# Patient Record
Sex: MALE | Race: ASIAN | Employment: OTHER | URBAN - METROPOLITAN AREA
[De-identification: names, ages, dates, MRNs, and addresses within clinical notes are randomized per-mention and may not be internally consistent; named-entity substitution may affect disease eponyms.]

---

## 2020-07-27 PROBLEM — R35.0 URINARY FREQUENCY: Status: ACTIVE | Noted: 2020-07-27

## 2020-07-27 PROBLEM — N40.1 ENLARGED PROSTATE WITH LOWER URINARY TRACT SYMPTOMS (LUTS): Status: ACTIVE | Noted: 2020-07-27

## 2020-07-27 PROBLEM — R35.1 NOCTURIA: Status: ACTIVE | Noted: 2020-07-27

## 2020-07-27 PROBLEM — R39.15 URGENCY OF URINATION: Status: ACTIVE | Noted: 2020-07-27

## 2020-07-27 PROBLEM — N52.9 ED (ERECTILE DYSFUNCTION) OF ORGANIC ORIGIN: Status: ACTIVE | Noted: 2020-07-27

## 2023-05-06 ENCOUNTER — HOSPITAL ENCOUNTER (INPATIENT)
Facility: HOSPITAL | Age: 68
LOS: 2 days | Discharge: HOME | End: 2023-05-08
Attending: STUDENT IN AN ORGANIZED HEALTH CARE EDUCATION/TRAINING PROGRAM | Admitting: HOSPITALIST
Payer: MEDICARE

## 2023-05-06 ENCOUNTER — ANESTHESIA EVENT (OUTPATIENT)
Dept: ENDOSCOPY | Facility: HOSPITAL | Age: 68
End: 2023-05-06
Payer: MEDICARE

## 2023-05-06 DIAGNOSIS — K92.2 GI BLEED: ICD-10-CM

## 2023-05-06 DIAGNOSIS — K92.1 MELENA: Primary | ICD-10-CM

## 2023-05-06 LAB
ALBUMIN SERPL-MCNC: 3.2 G/DL (ref 3.4–5)
ALBUMIN/GLOB SERPL: 0.9 {RATIO} (ref 1–2)
ALP LIVER SERPL-CCNC: 35 U/L
ALT SERPL-CCNC: 17 U/L
ANION GAP SERPL CALC-SCNC: 4 MMOL/L (ref 0–18)
ANTIBODY SCREEN: NEGATIVE
AST SERPL-CCNC: 8 U/L (ref 15–37)
BASOPHILS # BLD AUTO: 0.02 X10(3) UL (ref 0–0.2)
BASOPHILS NFR BLD AUTO: 0.3 %
BILIRUB SERPL-MCNC: 0.4 MG/DL (ref 0.1–2)
BUN BLD-MCNC: 43 MG/DL (ref 7–18)
CALCIUM BLD-MCNC: 8.4 MG/DL (ref 8.5–10.1)
CHLORIDE SERPL-SCNC: 109 MMOL/L (ref 98–112)
CO2 SERPL-SCNC: 26 MMOL/L (ref 21–32)
CREAT BLD-MCNC: 1.42 MG/DL
EOSINOPHIL # BLD AUTO: 0.05 X10(3) UL (ref 0–0.7)
EOSINOPHIL NFR BLD AUTO: 0.7 %
ERYTHROCYTE [DISTWIDTH] IN BLOOD BY AUTOMATED COUNT: 13.1 %
GFR SERPLBLD BASED ON 1.73 SQ M-ARVRAT: 54 ML/MIN/1.73M2 (ref 60–?)
GLOBULIN PLAS-MCNC: 3.5 G/DL (ref 2.8–4.4)
GLUCOSE BLD-MCNC: 127 MG/DL (ref 70–99)
GLUCOSE BLD-MCNC: 168 MG/DL (ref 70–99)
GLUCOSE BLD-MCNC: 99 MG/DL (ref 70–99)
HCT VFR BLD AUTO: 39.8 %
HGB BLD-MCNC: 13.4 G/DL
IMM GRANULOCYTES # BLD AUTO: 0.02 X10(3) UL (ref 0–1)
IMM GRANULOCYTES NFR BLD: 0.3 %
LYMPHOCYTES # BLD AUTO: 2.23 X10(3) UL (ref 1–4)
LYMPHOCYTES NFR BLD AUTO: 30.6 %
MCH RBC QN AUTO: 32 PG (ref 26–34)
MCHC RBC AUTO-ENTMCNC: 33.7 G/DL (ref 31–37)
MCV RBC AUTO: 95 FL
MONOCYTES # BLD AUTO: 0.63 X10(3) UL (ref 0.1–1)
MONOCYTES NFR BLD AUTO: 8.6 %
NEUTROPHILS # BLD AUTO: 4.34 X10 (3) UL (ref 1.5–7.7)
NEUTROPHILS # BLD AUTO: 4.34 X10(3) UL (ref 1.5–7.7)
NEUTROPHILS NFR BLD AUTO: 59.5 %
OSMOLALITY SERPL CALC.SUM OF ELEC: 303 MOSM/KG (ref 275–295)
PLATELET # BLD AUTO: 192 10(3)UL (ref 150–450)
POTASSIUM SERPL-SCNC: 3.6 MMOL/L (ref 3.5–5.1)
PROT SERPL-MCNC: 6.7 G/DL (ref 6.4–8.2)
RBC # BLD AUTO: 4.19 X10(6)UL
RH BLOOD TYPE: POSITIVE
SODIUM SERPL-SCNC: 139 MMOL/L (ref 136–145)
WBC # BLD AUTO: 7.3 X10(3) UL (ref 4–11)

## 2023-05-06 PROCEDURE — 99223 1ST HOSP IP/OBS HIGH 75: CPT | Performed by: HOSPITALIST

## 2023-05-06 RX ORDER — LOSARTAN POTASSIUM 50 MG/1
50 TABLET ORAL DAILY
Status: DISCONTINUED | OUTPATIENT
Start: 2023-05-06 | End: 2023-05-08

## 2023-05-06 RX ORDER — SENNOSIDES 8.6 MG
17.2 TABLET ORAL NIGHTLY PRN
Status: DISCONTINUED | OUTPATIENT
Start: 2023-05-06 | End: 2023-05-08

## 2023-05-06 RX ORDER — MELATONIN
3 NIGHTLY PRN
Status: DISCONTINUED | OUTPATIENT
Start: 2023-05-06 | End: 2023-05-08

## 2023-05-06 RX ORDER — ACETAMINOPHEN 500 MG
1000 TABLET ORAL EVERY 4 HOURS PRN
Status: DISCONTINUED | OUTPATIENT
Start: 2023-05-06 | End: 2023-05-08

## 2023-05-06 RX ORDER — POLYETHYLENE GLYCOL 3350 17 G/17G
17 POWDER, FOR SOLUTION ORAL DAILY PRN
Status: DISCONTINUED | OUTPATIENT
Start: 2023-05-06 | End: 2023-05-08

## 2023-05-06 RX ORDER — FINASTERIDE 5 MG/1
5 TABLET, FILM COATED ORAL DAILY
Status: DISCONTINUED | OUTPATIENT
Start: 2023-05-06 | End: 2023-05-08

## 2023-05-06 RX ORDER — BENZONATATE 100 MG/1
200 CAPSULE ORAL 3 TIMES DAILY PRN
Status: DISCONTINUED | OUTPATIENT
Start: 2023-05-06 | End: 2023-05-08

## 2023-05-06 RX ORDER — BISACODYL 10 MG
10 SUPPOSITORY, RECTAL RECTAL
Status: DISCONTINUED | OUTPATIENT
Start: 2023-05-06 | End: 2023-05-08

## 2023-05-06 RX ORDER — ALLOPURINOL 100 MG/1
100 TABLET ORAL DAILY
Status: DISCONTINUED | OUTPATIENT
Start: 2023-05-06 | End: 2023-05-08

## 2023-05-06 RX ORDER — ONDANSETRON 2 MG/ML
4 INJECTION INTRAMUSCULAR; INTRAVENOUS EVERY 6 HOURS PRN
Status: DISCONTINUED | OUTPATIENT
Start: 2023-05-06 | End: 2023-05-08

## 2023-05-06 RX ORDER — SODIUM PHOSPHATE, DIBASIC AND SODIUM PHOSPHATE, MONOBASIC 7; 19 G/133ML; G/133ML
1 ENEMA RECTAL ONCE AS NEEDED
Status: DISCONTINUED | OUTPATIENT
Start: 2023-05-06 | End: 2023-05-08

## 2023-05-06 RX ORDER — METOCLOPRAMIDE HYDROCHLORIDE 5 MG/ML
10 INJECTION INTRAMUSCULAR; INTRAVENOUS EVERY 8 HOURS PRN
Status: DISCONTINUED | OUTPATIENT
Start: 2023-05-06 | End: 2023-05-08

## 2023-05-06 RX ORDER — NICOTINE POLACRILEX 4 MG
30 LOZENGE BUCCAL
Status: DISCONTINUED | OUTPATIENT
Start: 2023-05-06 | End: 2023-05-08

## 2023-05-06 RX ORDER — ATENOLOL 25 MG/1
25 TABLET ORAL
Status: DISCONTINUED | OUTPATIENT
Start: 2023-05-07 | End: 2023-05-08

## 2023-05-06 RX ORDER — NICOTINE POLACRILEX 4 MG
15 LOZENGE BUCCAL
Status: DISCONTINUED | OUTPATIENT
Start: 2023-05-06 | End: 2023-05-08

## 2023-05-06 RX ORDER — TAMSULOSIN HYDROCHLORIDE 0.4 MG/1
0.4 CAPSULE ORAL
Status: DISCONTINUED | OUTPATIENT
Start: 2023-05-07 | End: 2023-05-08

## 2023-05-06 RX ORDER — DEXTROSE MONOHYDRATE 25 G/50ML
50 INJECTION, SOLUTION INTRAVENOUS
Status: DISCONTINUED | OUTPATIENT
Start: 2023-05-06 | End: 2023-05-08

## 2023-05-06 NOTE — ED INITIAL ASSESSMENT (HPI)
Patient arrives to the ED via [facility] from an Immediate Care with c/o fatigue,weakness, and black stool that started between yesterday and a couple days ago. Patient also c/o dizziness and intermittent shortness of breath.

## 2023-05-06 NOTE — PLAN OF CARE
Patient admitted via [address] to room. Safety precautions initiated. Bed in low position. Call light in reach. Pt AOx4. VSS. No complain of pain N/V/D. Up/ independent. Good appetite. Clear liquid diet maintained. NPO after midnight/ will endorse to the next shift. EGD planned tomorrow 8 am.  Stool collected for H. Pylori/ pending. Will continue plan of care.        Problem: GASTROINTESTINAL - ADULT  Goal: Minimal or absence of nausea and vomiting  Description: INTERVENTIONS:  - Maintain adequate hydration with IV or PO as ordered and tolerated  - Nasogastric tube to low intermittent suction as ordered  - Evaluate effectiveness of ordered antiemetic medications  - Provide nonpharmacologic comfort measures as appropriate  - Advance diet as tolerated, if ordered  - Obtain nutritional consult as needed  - Evaluate fluid balance  Outcome: Progressing  Goal: Maintains or returns to baseline bowel function  Description: INTERVENTIONS:  - Assess bowel function  - Maintain adequate hydration with IV or PO as ordered and tolerated  - Evaluate effectiveness of GI medications  - Encourage mobilization and activity  - Obtain nutritional consult as needed  - Establish a toileting routine/schedule  - Consider collaborating with pharmacy to review patient's medication profile  Outcome: Progressing

## 2023-05-07 ENCOUNTER — ANESTHESIA (OUTPATIENT)
Dept: ENDOSCOPY | Facility: HOSPITAL | Age: 68
End: 2023-05-07
Payer: MEDICARE

## 2023-05-07 LAB
ALBUMIN SERPL-MCNC: 2.9 G/DL (ref 3.4–5)
ALBUMIN/GLOB SERPL: 0.9 {RATIO} (ref 1–2)
ALP LIVER SERPL-CCNC: 32 U/L
ALT SERPL-CCNC: 18 U/L
ANION GAP SERPL CALC-SCNC: 5 MMOL/L (ref 0–18)
AST SERPL-CCNC: 10 U/L (ref 15–37)
ATRIAL RATE: 110 BPM
BILIRUB SERPL-MCNC: 0.7 MG/DL (ref 0.1–2)
BUN BLD-MCNC: 23 MG/DL (ref 7–18)
CALCIUM BLD-MCNC: 8 MG/DL (ref 8.5–10.1)
CHLORIDE SERPL-SCNC: 112 MMOL/L (ref 98–112)
CO2 SERPL-SCNC: 24 MMOL/L (ref 21–32)
CREAT BLD-MCNC: 1.27 MG/DL
ERYTHROCYTE [DISTWIDTH] IN BLOOD BY AUTOMATED COUNT: 13.2 %
GFR SERPLBLD BASED ON 1.73 SQ M-ARVRAT: 62 ML/MIN/1.73M2 (ref 60–?)
GLOBULIN PLAS-MCNC: 3.1 G/DL (ref 2.8–4.4)
GLUCOSE BLD-MCNC: 109 MG/DL (ref 70–99)
GLUCOSE BLD-MCNC: 118 MG/DL (ref 70–99)
GLUCOSE BLD-MCNC: 120 MG/DL (ref 70–99)
GLUCOSE BLD-MCNC: 190 MG/DL (ref 70–99)
GLUCOSE BLD-MCNC: 192 MG/DL (ref 70–99)
GLUCOSE BLD-MCNC: 93 MG/DL (ref 70–99)
HCT VFR BLD AUTO: 35.6 %
HGB BLD-MCNC: 11.6 G/DL
HGB BLD-MCNC: 11.6 G/DL
HGB BLD-MCNC: 12.1 G/DL
HGB BLD-MCNC: 12.8 G/DL
MCH RBC QN AUTO: 32 PG (ref 26–34)
MCHC RBC AUTO-ENTMCNC: 32.6 G/DL (ref 31–37)
MCV RBC AUTO: 98.3 FL
OSMOLALITY SERPL CALC.SUM OF ELEC: 297 MOSM/KG (ref 275–295)
P AXIS: 58 DEGREES
P-R INTERVAL: 142 MS
PLATELET # BLD AUTO: 175 10(3)UL (ref 150–450)
POTASSIUM SERPL-SCNC: 3.7 MMOL/L (ref 3.5–5.1)
PROT SERPL-MCNC: 6 G/DL (ref 6.4–8.2)
Q-T INTERVAL: 326 MS
QRS DURATION: 84 MS
QTC CALCULATION (BEZET): 441 MS
R AXIS: 48 DEGREES
RBC # BLD AUTO: 3.62 X10(6)UL
SODIUM SERPL-SCNC: 141 MMOL/L (ref 136–145)
T AXIS: 52 DEGREES
VENTRICULAR RATE: 110 BPM
WBC # BLD AUTO: 5.7 X10(3) UL (ref 4–11)

## 2023-05-07 PROCEDURE — 0DB98ZX EXCISION OF DUODENUM, VIA NATURAL OR ARTIFICIAL OPENING ENDOSCOPIC, DIAGNOSTIC: ICD-10-PCS | Performed by: INTERNAL MEDICINE

## 2023-05-07 PROCEDURE — 99232 SBSQ HOSP IP/OBS MODERATE 35: CPT | Performed by: HOSPITALIST

## 2023-05-07 PROCEDURE — 0DB68ZX EXCISION OF STOMACH, VIA NATURAL OR ARTIFICIAL OPENING ENDOSCOPIC, DIAGNOSTIC: ICD-10-PCS | Performed by: INTERNAL MEDICINE

## 2023-05-07 RX ORDER — SODIUM CHLORIDE, SODIUM LACTATE, POTASSIUM CHLORIDE, CALCIUM CHLORIDE 600; 310; 30; 20 MG/100ML; MG/100ML; MG/100ML; MG/100ML
INJECTION, SOLUTION INTRAVENOUS CONTINUOUS
Status: DISCONTINUED | OUTPATIENT
Start: 2023-05-07 | End: 2023-05-08

## 2023-05-07 RX ORDER — NALOXONE HYDROCHLORIDE 0.4 MG/ML
80 INJECTION, SOLUTION INTRAMUSCULAR; INTRAVENOUS; SUBCUTANEOUS AS NEEDED
Status: DISCONTINUED | OUTPATIENT
Start: 2023-05-07 | End: 2023-05-07 | Stop reason: HOSPADM

## 2023-05-07 RX ORDER — LIDOCAINE HYDROCHLORIDE 10 MG/ML
INJECTION, SOLUTION EPIDURAL; INFILTRATION; INTRACAUDAL; PERINEURAL AS NEEDED
Status: DISCONTINUED | OUTPATIENT
Start: 2023-05-07 | End: 2023-05-07 | Stop reason: SURG

## 2023-05-07 RX ORDER — EPHEDRINE SULFATE 50 MG/ML
INJECTION INTRAVENOUS AS NEEDED
Status: DISCONTINUED | OUTPATIENT
Start: 2023-05-07 | End: 2023-05-07 | Stop reason: SURG

## 2023-05-07 RX ORDER — SODIUM CHLORIDE, SODIUM LACTATE, POTASSIUM CHLORIDE, CALCIUM CHLORIDE 600; 310; 30; 20 MG/100ML; MG/100ML; MG/100ML; MG/100ML
INJECTION, SOLUTION INTRAVENOUS CONTINUOUS PRN
Status: DISCONTINUED | OUTPATIENT
Start: 2023-05-07 | End: 2023-05-07 | Stop reason: SURG

## 2023-05-07 RX ADMIN — EPHEDRINE SULFATE 10 MG: 50 INJECTION INTRAVENOUS at 08:11:00

## 2023-05-07 RX ADMIN — LIDOCAINE HYDROCHLORIDE 5 ML: 10 INJECTION, SOLUTION EPIDURAL; INFILTRATION; INTRACAUDAL; PERINEURAL at 08:09:00

## 2023-05-07 RX ADMIN — EPHEDRINE SULFATE 10 MG: 50 INJECTION INTRAVENOUS at 08:13:00

## 2023-05-07 RX ADMIN — SODIUM CHLORIDE, SODIUM LACTATE, POTASSIUM CHLORIDE, CALCIUM CHLORIDE: 600; 310; 30; 20 INJECTION, SOLUTION INTRAVENOUS at 08:05:00

## 2023-05-07 RX ADMIN — EPHEDRINE SULFATE 10 MG: 50 INJECTION INTRAVENOUS at 08:16:00

## 2023-05-07 RX ADMIN — EPHEDRINE SULFATE 10 MG: 50 INJECTION INTRAVENOUS at 08:20:00

## 2023-05-07 NOTE — PROGRESS NOTES
BRIEF DULY GI PROGRESS NOTE    Completed EGD today, see Op Report for findings and recommendations. - Advise observation on PPI gtt for 1 more day, likely dc home tomorrow if o/w stable.     Delayne Dubin, MD  [facility]  Department of Gastroenterology

## 2023-05-07 NOTE — ANESTHESIA POSTPROCEDURE EVALUATION
[facility]. Patient Status:  Inpatient   Age/Gender 76year old male MRN [id number]   Location [address] [address] Attending Brenda Finley MD   [facility] Day # 1 PCP Fariha Marquez DO       Anesthesia Post-op Note    ESOPHAGOGASTRODUODENOSCOPY (EGD) with biopises     Procedure Summary     Date: 05/07/23 Room / Location: [facility] ENDOSCOPY 03 / [facility] ENDOSCOPY    Anesthesia Start: 0805 Anesthesia Stop: 0590    Procedure: ESOPHAGOGASTRODUODENOSCOPY (EGD) with biopises Diagnosis:       GI bleed      (Gastric ulcer no active bleeding, duodenitis, small duodenal ulcer)    Surgeons: Yamile Mejia MD Anesthesiologist: Ken Andrade MD    Anesthesia Type: MAC ASA Status: 2          Anesthesia Type: MAC    Vitals Value Taken Time   /67 05/07/23 0834   Temp  05/07/23 0837   Pulse 101 05/07/23 0836   Resp 23 05/07/23 0836   SpO2 96 % 05/07/23 0836   Vitals shown include unvalidated device data. Patient Location: Endoscopy    Anesthesia Type: MAC    Airway Patency: patent    Postop Pain Control: adequate    Mental Status: preanesthetic baseline    Nausea/Vomiting: none    Cardiopulmonary/Hydration status: stable euvolemic    Complications: no apparent anesthesia related complications    Postop vital signs: stable    Dental Exam: Unchanged from Preop    Patient to be discharged home when criteria met.

## 2023-05-07 NOTE — PLAN OF CARE
Pt AOx4. VSS. No complain of pain N/V/D. Up/ independent. Good appetite. EGD done this morning. LR 1000ml finished infusing per order. Pt advanced from full liquid to Regular diet. Tolerated well. Protonix 10 ml/hr infusing. Will continue plan of care.      Problem: GASTROINTESTINAL - ADULT  Goal: Minimal or absence of nausea and vomiting  Description: INTERVENTIONS:  - Maintain adequate hydration with IV or PO as ordered and tolerated  - Nasogastric tube to low intermittent suction as ordered  - Evaluate effectiveness of ordered antiemetic medications  - Provide nonpharmacologic comfort measures as appropriate  - Advance diet as tolerated, if ordered  - Obtain nutritional consult as needed  - Evaluate fluid balance  Outcome: Progressing  Goal: Maintains or returns to baseline bowel function  Description: INTERVENTIONS:  - Assess bowel function  - Maintain adequate hydration with IV or PO as ordered and tolerated  - Evaluate effectiveness of GI medications  - Encourage mobilization and activity  - Obtain nutritional consult as needed  - Establish a toileting routine/schedule  - Consider collaborating with pharmacy to review patient's medication profile  Outcome: Progressing

## 2023-05-07 NOTE — PROGRESS NOTES
Alert and orientated x4. No complaints of pain. No complaints of nausea or diarrhea. Ambulating to bathroom. protonix drip infusing.   NPO for EGD in am.

## 2023-05-08 VITALS
RESPIRATION RATE: 16 BRPM | WEIGHT: 154.63 LBS | SYSTOLIC BLOOD PRESSURE: 115 MMHG | DIASTOLIC BLOOD PRESSURE: 74 MMHG | HEIGHT: 66 IN | HEART RATE: 73 BPM | OXYGEN SATURATION: 97 % | TEMPERATURE: 98 F | BODY MASS INDEX: 24.85 KG/M2

## 2023-05-08 LAB
GLUCOSE BLD-MCNC: 103 MG/DL (ref 70–99)
GLUCOSE BLD-MCNC: 104 MG/DL (ref 70–99)
H PYLORI AG STL QL IA: POSITIVE
HGB BLD-MCNC: 11.4 G/DL
HGB BLD-MCNC: 12.1 G/DL

## 2023-05-08 PROCEDURE — 99239 HOSP IP/OBS DSCHRG MGMT >30: CPT | Performed by: HOSPITALIST

## 2023-05-08 RX ORDER — PANTOPRAZOLE SODIUM 40 MG/1
40 TABLET, DELAYED RELEASE ORAL
Qty: 120 TABLET | Refills: 0 | Status: SHIPPED | OUTPATIENT
Start: 2023-05-08

## 2023-05-08 RX ORDER — PANTOPRAZOLE SODIUM 40 MG/1
40 TABLET, DELAYED RELEASE ORAL
Status: DISCONTINUED | OUTPATIENT
Start: 2023-05-08 | End: 2023-05-08

## 2023-05-08 NOTE — PROGRESS NOTES
Patient alert x4, VSS. hgb checks q8 hours. Hgb overnight was 11.4. MD paged about result since Hgb dropped from 12. 1. no active bleeding noted. protonix gtt throughout night. No new orders per MD. All meds given per [facility]. Ambulates self to bathroom. Slept well overnight.

## 2024-03-09 ENCOUNTER — HOSPITAL ENCOUNTER (EMERGENCY)
Facility: HOSPITAL | Age: 69
Discharge: HOME | End: 2024-03-09
Attending: EMERGENCY MEDICINE
Payer: MEDICARE

## 2024-03-09 ENCOUNTER — APPOINTMENT (OUTPATIENT)
Dept: CT IMAGING | Facility: HOSPITAL | Age: 69
End: 2024-03-09
Attending: EMERGENCY MEDICINE
Payer: MEDICARE

## 2024-03-09 ENCOUNTER — APPOINTMENT (OUTPATIENT)
Dept: MRI IMAGING | Facility: HOSPITAL | Age: 69
End: 2024-03-09
Attending: EMERGENCY MEDICINE
Payer: MEDICARE

## 2024-03-09 VITALS
OXYGEN SATURATION: 98 % | WEIGHT: 146.63 LBS | HEIGHT: 63 IN | BODY MASS INDEX: 25.98 KG/M2 | HEART RATE: 67 BPM | DIASTOLIC BLOOD PRESSURE: 85 MMHG | TEMPERATURE: 98 F | RESPIRATION RATE: 16 BRPM | SYSTOLIC BLOOD PRESSURE: 128 MMHG

## 2024-03-09 DIAGNOSIS — G45.4 TGA (TRANSIENT GLOBAL AMNESIA): Primary | ICD-10-CM

## 2024-03-09 LAB
ALBUMIN SERPL-MCNC: 3.9 G/DL (ref 3.4–5)
ALBUMIN/GLOB SERPL: 0.9 {RATIO} (ref 1–2)
ALP LIVER SERPL-CCNC: 44 U/L
ALT SERPL-CCNC: 18 U/L
ANION GAP SERPL CALC-SCNC: 7 MMOL/L (ref 0–18)
AST SERPL-CCNC: 20 U/L (ref 15–37)
BASOPHILS # BLD AUTO: 0.03 X10(3) UL (ref 0–0.2)
BASOPHILS NFR BLD AUTO: 0.5 %
BILIRUB SERPL-MCNC: 0.6 MG/DL (ref 0.1–2)
BILIRUB UR QL STRIP.AUTO: NEGATIVE
BUN BLD-MCNC: 20 MG/DL (ref 9–23)
CALCIUM BLD-MCNC: 9.6 MG/DL (ref 8.5–10.1)
CHLORIDE SERPL-SCNC: 106 MMOL/L (ref 98–112)
CLARITY UR REFRACT.AUTO: CLEAR
CO2 SERPL-SCNC: 25 MMOL/L (ref 21–32)
COLOR UR AUTO: COLORLESS
CREAT BLD-MCNC: 1.29 MG/DL
EGFRCR SERPLBLD CKD-EPI 2021: 60 ML/MIN/1.73M2 (ref 60–?)
EOSINOPHIL # BLD AUTO: 0.02 X10(3) UL (ref 0–0.7)
EOSINOPHIL NFR BLD AUTO: 0.3 %
ERYTHROCYTE [DISTWIDTH] IN BLOOD BY AUTOMATED COUNT: 12.6 %
GLOBULIN PLAS-MCNC: 4.2 G/DL (ref 2.8–4.4)
GLUCOSE BLD-MCNC: 128 MG/DL (ref 70–99)
GLUCOSE BLD-MCNC: 138 MG/DL (ref 70–99)
GLUCOSE UR STRIP.AUTO-MCNC: NORMAL MG/DL
HCT VFR BLD AUTO: 48 %
HGB BLD-MCNC: 16 G/DL
IMM GRANULOCYTES # BLD AUTO: 0.02 X10(3) UL (ref 0–1)
IMM GRANULOCYTES NFR BLD: 0.3 %
INR BLD: 0.96 (ref 0.8–1.2)
KETONES UR STRIP.AUTO-MCNC: NEGATIVE MG/DL
LEUKOCYTE ESTERASE UR QL STRIP.AUTO: NEGATIVE
LYMPHOCYTES # BLD AUTO: 1.19 X10(3) UL (ref 1–4)
LYMPHOCYTES NFR BLD AUTO: 18.9 %
MCH RBC QN AUTO: 33.1 PG (ref 26–34)
MCHC RBC AUTO-ENTMCNC: 33.3 G/DL (ref 31–37)
MCV RBC AUTO: 99.4 FL
MONOCYTES # BLD AUTO: 0.36 X10(3) UL (ref 0.1–1)
MONOCYTES NFR BLD AUTO: 5.7 %
NEUTROPHILS # BLD AUTO: 4.67 X10 (3) UL (ref 1.5–7.7)
NEUTROPHILS # BLD AUTO: 4.67 X10(3) UL (ref 1.5–7.7)
NEUTROPHILS NFR BLD AUTO: 74.3 %
NITRITE UR QL STRIP.AUTO: NEGATIVE
OSMOLALITY SERPL CALC.SUM OF ELEC: 291 MOSM/KG (ref 275–295)
PH UR STRIP.AUTO: 6 [PH] (ref 5–8)
PLATELET # BLD AUTO: 199 10(3)UL (ref 150–450)
POTASSIUM SERPL-SCNC: 4.2 MMOL/L (ref 3.5–5.1)
PROT SERPL-MCNC: 8.1 G/DL (ref 6.4–8.2)
PROT UR STRIP.AUTO-MCNC: NEGATIVE MG/DL
PROTHROMBIN TIME: 12.8 SECONDS (ref 11.6–14.8)
RBC # BLD AUTO: 4.83 X10(6)UL
RBC UR QL AUTO: NEGATIVE
SODIUM SERPL-SCNC: 138 MMOL/L (ref 136–145)
SP GR UR STRIP.AUTO: 1.01 (ref 1–1.03)
TROPONIN I SERPL HS-MCNC: 4 NG/L
UROBILINOGEN UR STRIP.AUTO-MCNC: NORMAL MG/DL
WBC # BLD AUTO: 6.3 X10(3) UL (ref 4–11)

## 2024-03-09 PROCEDURE — 93010 ELECTROCARDIOGRAM REPORT: CPT

## 2024-03-09 PROCEDURE — 82962 GLUCOSE BLOOD TEST: CPT

## 2024-03-09 PROCEDURE — 99284 EMERGENCY DEPT VISIT MOD MDM: CPT

## 2024-03-09 PROCEDURE — 70553 MRI BRAIN STEM W/O & W/DYE: CPT | Performed by: EMERGENCY MEDICINE

## 2024-03-09 PROCEDURE — 99285 EMERGENCY DEPT VISIT HI MDM: CPT

## 2024-03-09 PROCEDURE — 81003 URINALYSIS AUTO W/O SCOPE: CPT | Performed by: EMERGENCY MEDICINE

## 2024-03-09 PROCEDURE — 85025 COMPLETE CBC W/AUTO DIFF WBC: CPT | Performed by: EMERGENCY MEDICINE

## 2024-03-09 PROCEDURE — A9575 INJ GADOTERATE MEGLUMI 0.1ML: HCPCS | Performed by: EMERGENCY MEDICINE

## 2024-03-09 PROCEDURE — 80053 COMPREHEN METABOLIC PANEL: CPT | Performed by: EMERGENCY MEDICINE

## 2024-03-09 PROCEDURE — 70450 CT HEAD/BRAIN W/O DYE: CPT | Performed by: EMERGENCY MEDICINE

## 2024-03-09 PROCEDURE — 84484 ASSAY OF TROPONIN QUANT: CPT | Performed by: EMERGENCY MEDICINE

## 2024-03-09 PROCEDURE — 85610 PROTHROMBIN TIME: CPT | Performed by: EMERGENCY MEDICINE

## 2024-03-09 PROCEDURE — 93005 ELECTROCARDIOGRAM TRACING: CPT

## 2024-03-09 PROCEDURE — 36415 COLL VENOUS BLD VENIPUNCTURE: CPT

## 2024-03-09 RX ORDER — ASPIRIN 325 MG
325 TABLET, DELAYED RELEASE (ENTERIC COATED) ORAL EVERY 6 HOURS PRN
Qty: 120 TABLET | Refills: 0 | Status: SHIPPED | OUTPATIENT
Start: 2024-03-09 | End: 2024-07-07

## 2024-03-09 RX ORDER — GADOTERATE MEGLUMINE 376.9 MG/ML
20 INJECTION INTRAVENOUS
Status: COMPLETED | OUTPATIENT
Start: 2024-03-09 | End: 2024-03-09

## 2024-03-10 LAB
ATRIAL RATE: 76 BPM
P AXIS: 56 DEGREES
P-R INTERVAL: 162 MS
Q-T INTERVAL: 366 MS
QRS DURATION: 86 MS
QTC CALCULATION (BEZET): 411 MS
R AXIS: 42 DEGREES
T AXIS: 51 DEGREES
VENTRICULAR RATE: 76 BPM

## 2024-03-10 NOTE — ED QUICK NOTES
Report received from IVIS Bean. Rounding Completed    Plan of Care reviewed. Waiting for urinalysis results.  Elimination needs assessed.  Provided update on plan of care.    Bed is locked and in lowest position. Call light within reach.

## 2024-03-10 NOTE — DISCHARGE INSTRUCTIONS
Transient global amnesia    Overview  Transient global amnesia is an episode of confusion that comes on suddenly in a person who is otherwise alert. This confused state isn't caused by a more common neurological condition, such as epilepsy or stroke.    During an episode of transient global amnesia, a person is unable to create new memory, so the memory of recent events disappears. You can't remember where you are or how you got there. You may not remember anything about what's happening right now. You may keep repeating the same questions because you don't remember the answers you've just been given. You may also draw a blank when asked to remember things that happened a day, a month or even a year ago.    The condition most often affects people in middle or older age. With transient global amnesia, you do remember who you are, and you recognize the people you know well. Episodes of transient global amnesia always get better slowly over a few hours. During recovery, you may begin to remember events and circumstances. Transient global amnesia isn't serious, but it can still be frightening.      Symptoms  The main symptom of transient global amnesia is being unable to create new memories and remember the recent past. Once that symptom is confirmed, ruling out other possible causes of amnesia is important.    You must have these signs and symptoms to be diagnosed with transient global amnesia:    Sudden onset of confusion that includes memory loss, seen by a witness  Being awake and alert and knowing who you are, despite memory loss  Normal cognition, such as the ability to recognize and name familiar objects and follow simple directions  No signs of damage to a particular area of the brain, such as being unable to move an arm or leg, movements you can't control, or problems understanding words  More symptoms and history that may help diagnose transient global amnesia:    Symptoms lasting no more than 24 hours and  generally shorter  Gradual return of memory  No recent head injury  No signs of seizures during the period of amnesia  No history of active epilepsy  Another common sign of transient global amnesia due to the inability to create new memories includes repetitive questioning, usually of the same question -- for example, \"What am I doing here?\" or \"How did we get here?\"        When to see a doctor  Seek immediate medical attention for anyone who quickly goes from normal awareness of present reality to confusion about what just happened. If the person experiencing memory loss is too confused to call an ambulance, call one yourself.    Transient global amnesia isn't dangerous. But there's no easy way to tell the difference between transient global amnesia and the life-threatening illnesses that can also cause sudden memory loss.          Causes  The underlying cause of transient global amnesia is unknown. There may be a link between transient global amnesia and a history of migraines. But experts don't understand the factors that contribute to both conditions. Another possible cause is the overfilling of veins with blood due to some sort of blockage or other problem with the flow of blood (venous congestion).    While the likelihood of transient global amnesia after these events is very low, some commonly reported events that may trigger it include:    Sudden immersion in cold or hot water  Strenuous physical activity  Sexual intercourse  Medical procedures, such as angiography or endoscopy  Mild head trauma  Being emotionally upset, perhaps by bad news, conflict or overwork  Risk factors  Interestingly, many studies have found that high blood pressure and high cholesterol -- which are closely linked to strokes -- are not risk factors for transient global amnesia. This is probably because transient global amnesia doesn't represent blood vessel diseases of aging. Your sex doesn't seem to affect your risk, either.    The  clearest risk factors are:    Age. People age 50 and older have a higher risk of transient global amnesia than do younger people.  History of migraines. If you have migraines, your risk of transient global amnesia is significantly higher than that of someone without migraines.  Complications  Transient global amnesia has no direct complications. It's not a risk factor for stroke or epilepsy. It's possible to have a second episode of transient global amnesia, but it's extremely rare to have more than two.    But even temporary memory loss can cause emotional distress. If you need reassurance, ask your doctor to go over the results of your neurological exam and diagnostic tests with you.    Prevention  Because the cause of transient global amnesia is unknown and the rate of recurrence is low, there's no real way to prevent the condition.

## 2024-03-10 NOTE — ED PROVIDER NOTES
Patient Seen in: [facility] Emergency Department      History     Chief Complaint   Patient presents with    Altered Mental Status     Stated Complaint:     Subjective:   HPI    68-year-old male with hypertension, chronic kidney disease brought to ED for evaluation for altered mental status.  Patient arrived by ambulance he was repeating some questions which was somewhat concerning.  Patient states he is just feeling anxious lately.  Denies any focal motor or sensory gait vision or speech problems.  No headache.  Denies any chest pain shortness of breath abdominal pain nausea vomit diarrhea.  No other associate symptoms.  No other complaints.    Objective:   Past Medical History:   Diagnosis Date    Essential hypertension     GOUT     HYPERTENSION               Past Surgical History:   Procedure Laterality Date    COLONOSCOPY  08/2016    two 3 mm rectosigmoid polyps     COLONOSCOPY,DIAGNOSTIC  6/26/08    normal    OTHER SURGICAL HISTORY  9-    Prostate Biopsy - Dr. SANTO    OTHER SURGICAL HISTORY  08/31/2020    cystoscopy/[facility] Dr. Echeverria                Social History     Socioeconomic History    Marital status:    Tobacco Use    Smoking status: Never    Smokeless tobacco: Never   Substance and Sexual Activity    Alcohol use: No              Review of Systems    Positive for stated complaint:   Other systems are as noted in HPI.  Constitutional and vital signs reviewed.      All other systems reviewed and negative except as noted above.    Physical Exam     ED Triage Vitals   BP 03/09/24 1745 144/66   Pulse 03/09/24 1750 78   Resp 03/09/24 1750 18   Temp 03/09/24 1750 98 °F (36.7 °C)   Temp src 03/09/24 1750 Temporal   SpO2 03/09/24 1750 99 %   O2 Device 03/09/24 1750 None (Room air)       Current:/85   Pulse 63   Temp 98 °F (36.7 °C) (Temporal)   Resp 16   Ht 160 cm (5' 3\")   Wt 66.5 kg   SpO2 97%   BMI 25.97 kg/m²         Physical Exam  Vitals and nursing note reviewed.    Constitutional:       General: He is not in acute distress.     Appearance: He is well-developed.   HENT:      Head: Normocephalic and atraumatic.      Mouth/Throat:      Pharynx: No oropharyngeal exudate.   Eyes:      General: No scleral icterus.     Conjunctiva/sclera: Conjunctivae normal.      Pupils: Pupils are equal, round, and reactive to light.   Cardiovascular:      Rate and Rhythm: Normal rate and regular rhythm.      Heart sounds: No murmur heard.     No friction rub. No gallop.   Pulmonary:      Effort: Pulmonary effort is normal. No respiratory distress.      Breath sounds: Normal breath sounds. No stridor. No wheezing or rales.   Abdominal:      General: There is no distension.      Palpations: Abdomen is soft.      Tenderness: There is no abdominal tenderness. There is no guarding or rebound.   Musculoskeletal:         General: No tenderness. Normal range of motion.      Cervical back: Normal range of motion and neck supple.   Skin:     General: Skin is warm and dry.      Findings: No erythema or rash.   Neurological:      General: No focal deficit present.      Mental Status: He is alert and oriented to person, place, and time.      Cranial Nerves: No cranial nerve deficit.      Sensory: No sensory deficit.      Motor: No weakness or abnormal muscle tone.      Coordination: Coordination normal.      Gait: Gait normal.      Comments: Patient was independently able to get out the EMS gurney and then placed himself into the cot   Psychiatric:         Mood and Affect: Mood normal.         Behavior: Behavior normal.              ED Course     Labs Reviewed   COMP METABOLIC PANEL (14) - Abnormal; Notable for the following components:       Result Value    Glucose 138 (*)     Alkaline Phosphatase 44 (*)     A/G Ratio 0.9 (*)     All other components within normal limits   URINALYSIS, ROUTINE - Abnormal; Notable for the following components:    Urine Color Colorless (*)     Bacteria Urine Rare (*)     All  other components within normal limits   POCT GLUCOSE - Abnormal; Notable for the following components:    POC Glucose 128 (*)     All other components within normal limits   TROPONIN I HIGH SENSITIVITY - Normal   PROTHROMBIN TIME (PT) - Normal   CBC WITH DIFFERENTIAL WITH PLATELET    Narrative:     The following orders were created for panel order CBC With Differential With Platelet.  Procedure                               Abnormality         Status                     ---------                               -----------         ------                     CBC W/ DIFFERENTIAL[[id number]]                              Final result                 Please view results for these tests on the individual orders.   RAINBOW DRAW LAVENDER   RAINBOW DRAW LIGHT GREEN   RAINBOW DRAW GOLD   RAINBOW DRAW BLUE   CBC W/ DIFFERENTIAL     EKG    Rate, intervals and axes as noted on EKG Report.  Rate: 76  Rhythm: Sinus Rhythm  Reading:  normal ekg                           MDM        -Tracing on cardiac monitor and pulse oximetry was reviewed by myself.   -The cardiac monitor revealed normal sinus rhythm as interpreted by me. The cardiac monitor was ordered to monitor the patient for dysrhythmia  -Pulse oximetry was interpreted by me and was normal.  Pulse oximeter was ordered to monitor patient for hypoxia.        -History source other than patient -EMS        -Comorbidities did add complexity to the management are mentioned in the HPI above        -I personally reviewed the prior external notes and the medical record to obtain additional history-I reviewed November 15, 2023 patient GI notes.  He underwent colonoscopy and it revealed a nonbleeding grade 1 internal hemorrhoids.  Normal colon.        -DDX: Includes but not limited to  -UTI, encephalopathy, stroke, TGA            -I personally reviewed the CT findings and it shows no acute hemorrhage  Please refer to radiology report for official interpretation      MRI brain without any  acute findings.    Labs Reviewed   COMP METABOLIC PANEL (14) - Abnormal; Notable for the following components:       Result Value    Glucose 138 (*)     Alkaline Phosphatase 44 (*)     A/G Ratio 0.9 (*)     All other components within normal limits   URINALYSIS, ROUTINE - Abnormal; Notable for the following components:    Urine Color Colorless (*)     Bacteria Urine Rare (*)     All other components within normal limits   POCT GLUCOSE - Abnormal; Notable for the following components:    POC Glucose 128 (*)     All other components within normal limits   TROPONIN I HIGH SENSITIVITY - Normal   PROTHROMBIN TIME (PT) - Normal   CBC WITH DIFFERENTIAL WITH PLATELET    Narrative:     The following orders were created for panel order CBC With Differential With Platelet.  Procedure                               Abnormality         Status                     ---------                               -----------         ------                     CBC W/ DIFFERENTIAL[[id number]]                              Final result                 Please view results for these tests on the individual orders.   RAINBOW DRAW LAVENDER   RAINBOW DRAW LIGHT GREEN   RAINBOW DRAW GOLD   RAINBOW DRAW BLUE   CBC W/ DIFFERENTIAL     Laboratory workup is unremarkable.  CBC CMP urinalysis EKG troponin all unremarkable.    .  Patient repeating questions.  Appears to have short-term memory loss did not recognize me when I returned to the room.    Family at the bedside and throughout the ED course is symptoms began to improve and he is returning closer to baseline.  Signs symptoms suggestive of transient global amnesia.  He is eager to go home on reexamination approximately 10:20 PM.  Memory is improved.  He I will place him on aspirin and have him follow-up with neurology.  Patient discharged home stable condition.                                           Medical Decision Making      Disposition and Plan     Clinical Impression:  1. TGA (transient global  amnesia)         Disposition:  Discharge  3/9/2024 10:27 pm    Follow-up:  Seb Peña MD  [address]  [address] [address]  [phone]    Schedule an appointment as soon as possible for a visit            Medications Prescribed:  Current Discharge Medication List        START taking these medications    Details   aspirin 325 MG Oral Tab EC Take 1 tablet (325 mg total) by mouth every 6 (six) hours as needed for Pain.  Qty: 120 tablet, Refills: 0

## 2024-04-24 ENCOUNTER — OFFICE VISIT (OUTPATIENT)
Dept: NEUROLOGY | Facility: CLINIC | Age: 69
End: 2024-04-24
Payer: MEDICARE

## 2024-04-24 VITALS
RESPIRATION RATE: 16 BRPM | DIASTOLIC BLOOD PRESSURE: 68 MMHG | BODY MASS INDEX: 24 KG/M2 | HEART RATE: 68 BPM | SYSTOLIC BLOOD PRESSURE: 110 MMHG | OXYGEN SATURATION: 98 % | WEIGHT: 148.38 LBS

## 2024-04-24 DIAGNOSIS — G45.4 TGA (TRANSIENT GLOBAL AMNESIA): Primary | ICD-10-CM

## 2024-04-24 PROCEDURE — 99204 OFFICE O/P NEW MOD 45 MIN: CPT | Performed by: OTHER

## 2024-04-24 RX ORDER — ROSUVASTATIN CALCIUM 10 MG/1
10 TABLET, COATED ORAL NIGHTLY
COMMUNITY
Start: 2024-04-08

## 2024-04-24 RX ORDER — LOSARTAN POTASSIUM 100 MG/1
100 TABLET ORAL DAILY
COMMUNITY
Start: 2024-03-31

## 2024-04-24 NOTE — PATIENT INSTRUCTIONS
Refill policies:    Allow 2-3 business days for refills; controlled substances may take longer.  Contact your pharmacy at least 5 days prior to running out of medication and have them send an electronic request or submit request through the “request refill” option in your [employer] account.  Refills are not addressed on weekends; covering physicians do not authorize routine medications on weekends.  No narcotics or controlled substances are refilled after noon on Fridays or by on call physicians.  By law, narcotics must be electronically prescribed.  A 30 day supply with no refills is the maximum allowed.  If your prescription is due for a refill, you may be due for a follow up appointment.  To best provide you care, patients receiving routine medications need to be seen at least once a year.  Patients receiving narcotic/controlled substance medications need to be seen at least once every 3 months.  In the event that your preferred pharmacy does not have the requested medication in stock (e.g. Backordered), it is your responsibility to find another pharmacy that has the requested medication available.  We will gladly send a new prescription to that pharmacy at your request.    Scheduling Tests:    If your physician has ordered radiology tests such as MRI or CT scans, please contact Central Scheduling at [phone] right away to schedule the test.  Once scheduled, the [facility] Centralized Referral Team will work with your insurance carrier to obtain pre-certification or prior authorization.  Depending on your insurance carrier, approval may take 3-10 days.  It is highly recommended patients assure they have received an authorization before having a test performed.  If test is done without insurance authorization, patient may be responsible for the entire amount billed.      Precertification and Prior Authorizations:  If your physician has recommended that you have a procedure or additional testing performed the [facility]  Centralized Referral Team will contact your insurance carrier to obtain pre-certification or prior authorization.    You are strongly encouraged to contact your insurance carrier to verify that your procedure/test has been approved and is a COVERED benefit.  Although the [facility] Centralized Referral Team does its due diligence, the insurance carrier gives the disclaimer that \"Although the procedure is authorized, this does not guarantee payment.\"    Ultimately the patient is responsible for payment.   Thank you for your understanding in this matter.  Paperwork Completion:  If you require FMLA or disability paperwork for your recovery, please make sure to either drop it off or have it faxed to our office at [phone]. Be sure the form has your name and date of birth on it.  The form will be faxed to our Forms Department and they will complete it for you.  There is a 25$ fee for all forms that need to be filled out.  Please be aware there is a 10-14 day turnaround time.  You will need to sign a release of information (SASCHA) form if your paperwork does not come with one.  You may call the Forms Department with any questions at [phone].  Their fax number is [phone].

## 2024-04-25 NOTE — PROGRESS NOTES
[facility]    Date of consult: 4/24/2024    Assessment:    ICD-10-CM    1. TGA (transient global amnesia)  G45.4         Plan:  Stress management  PCP to follow  Reviewed MRI brain with pt , no stroke  See orders and medications filed with this encounter. The patient indicates understanding of these issues and agrees with the plan.  Discussed with patient regarding assessment, work up, care plan   RTC 1 year  Pt should go ER for any new or worsening symptoms and contact office     Subjective:   CC/Reason for consult: TGA   HPI: Juliano Gar is a 69 year old male with past medical history as listed below presents here for initial evaluation of one episode of TGA. On 3/9 he went ER for altered mental status, he has history of hypertension, chronic kidney disease. Patient arrived by ambulance he was repeating some questions which was somewhat concerning on that day. Patient states he is just feeling anxious lately. No headache. No new focal weakness, numbness, gait imbalance, vision or speech difficulties. He has some anxiety and increased stress might be causing that episode, no stroke from MRI brain, pt denies further episode or any new concerns.    History/Other:   REVIEW OF SYSTEMS:  A comprehensive 14-point system was reviewed. Pertinent positives and negatives are noted in HPI.       Current Outpatient Medications:     rosuvastatin 10 MG Oral Tab, Take 1 tablet (10 mg total) by mouth nightly., Disp: , Rfl:     losartan 100 MG Oral Tab, Take 1 tablet (100 mg total) by mouth daily., Disp: , Rfl:     CALCIUM-MAGNESIUM-VITAMIN D OR, Take 1 tablet by mouth daily. 1000mg/500mg/10mcg, Disp: , Rfl:     finasteride 5 MG Oral Tab, Take 1 tablet (5 mg total) by mouth daily., Disp: , Rfl:     Tadalafil (CIALIS) 20 MG Oral Tab, Take 1 tablet (20 mg total) by mouth as needed for Erectile Dysfunction., Disp: 8 tablet, Rfl: 12    atenolol (TENORMIN) 25 MG Oral Tab, 2 TAB QD (Patient taking differently:  Take 1 tablet (25 mg total) by mouth daily.), Disp: 180 tablet, Rfl: 0    Omega-3 Fatty Acids (FISH OIL OR), QD, Disp: , Rfl:     Calcium + D 600-200 MG-UNIT Oral Tab, 1 TABLET DAILY, Disp: , Rfl:     allopurinol (ZYLOPRIM) 100 MG Oral Tab, Take 1 tablet by mouth daily. (Patient taking differently: Take 1 tablet (100 mg total) by mouth daily. Pt is taking. Does not remember the dose), Disp: 90 tablet, Rfl: 0  Allergies:  No Known Allergies  Past Medical History:    Essential hypertension    GOUT    HYPERTENSION     Past Surgical History:   Procedure Laterality Date    Colonoscopy  08/2016    two 3 mm rectosigmoid polyps     Colonoscopy,diagnostic  6/26/08    normal    Other surgical history  9-    Prostate Biopsy - Dr. SANTO    Other surgical history  08/31/2020    cystoscopy/[facility] Dr. Echeverria     Social History:  Social History     Tobacco Use    Smoking status: Never    Smokeless tobacco: Never   Substance Use Topics    Alcohol use: No     Family History   Problem Relation Age of Onset    Hypertension Father     Hypertension Mother     Heart Disorder Mother       Objective:   Physical Examination:  /68   Pulse 68   Resp 16   Wt 148 lb 6.4 oz (67.3 kg)   SpO2 98%   BMI 23.95 kg/m²   General: Awake and alert; in no acute distress  HEENT: Eye sclerae are anicteric; scalp is atraumatic  Neck: Supple  Cardiac: Regular rate and regular rhythm  Lungs: Clear   Abdomen:  non-tender  Extremities: No clubbing or cyanosis; moves extremities   Psychiatric: Normal mood and affect; answers questions appropriately  Dermatologic: No rashes; no edema  Neurological Examination:  Language: normal   Speech: no dysarthria  CN: II-XII intact  Motor strength: 5/5 all extremities  Tone: normal  DTRs: 1+ symmetric  Coordination: Normal FTN  Sensory: symmetric   Gait: nl    Data Reviewed on 4/24/2024  Notes Reviewed on 4/24/2024  Labs Reviewed  on 4/24/2024    Isis \"Yfn\"MD Ranjith   Neurology  [facility]  Janelle  4/24/2024, 9:21 PM  Consultation Report: being sent/fax/route to requesting provider   CC: Martín Perez DO

## 2024-10-04 ENCOUNTER — HOSPITAL ENCOUNTER (EMERGENCY)
Facility: HOSPITAL | Age: 69
Discharge: HOME | End: 2024-10-04
Attending: EMERGENCY MEDICINE
Payer: MEDICARE

## 2024-10-04 VITALS
HEART RATE: 75 BPM | BODY MASS INDEX: 23.3 KG/M2 | OXYGEN SATURATION: 94 % | WEIGHT: 145 LBS | SYSTOLIC BLOOD PRESSURE: 107 MMHG | TEMPERATURE: 98 F | HEIGHT: 66 IN | RESPIRATION RATE: 22 BRPM | DIASTOLIC BLOOD PRESSURE: 68 MMHG

## 2024-10-04 DIAGNOSIS — M54.16 LUMBAR RADICULOPATHY: Primary | ICD-10-CM

## 2024-10-04 PROCEDURE — 99283 EMERGENCY DEPT VISIT LOW MDM: CPT

## 2024-10-04 RX ORDER — GABAPENTIN 300 MG/1
300 CAPSULE ORAL 2 TIMES DAILY
Qty: 42 CAPSULE | Refills: 0 | Status: SHIPPED | OUTPATIENT
Start: 2024-10-04 | End: 2024-10-25

## 2024-10-04 NOTE — ED INITIAL ASSESSMENT (HPI)
Patient to the ED with c/o left lower back pain since Tuesday. Patient went to [facility] and was diagnosed with sciatica. Patient also goes to PT for right shoulder pain. Patient was sent home from [facility] with medication to help manage his sciatica symptoms. Patient states he was sent home with steroids and muscle relaxer.

## 2024-10-04 NOTE — DISCHARGE INSTRUCTIONS
Continue taking methylprednisolone as directed.  Stop taking cyclobenzaprine/Flexeril.    If the medication and I prescribed is not effective after 1 week, you may start taking it 3 times a day instead of 2 times a day.        Neurologic Instructions    Call your doctor if you have:  increased pain or headache.   trouble seeing, walking or using your arms or legs.   dizziness or passing out.   any change in behavior (agitated or sleepy).   trouble being awakened from sleep.   numbness in your face, arm or leg.   extreme weakness.   trouble talking.   nausea and vomiting.   any new or severe symptoms.    Take your medicines as prescribed. Most important, see a doctor again as discussed. If you have problems that we have not discussed, call or visit your doctor right away. If you cannot reach your doctor, return to the Emergency Department.

## 2024-10-04 NOTE — ED PROVIDER NOTES
Patient Seen in: [facility] Emergency Department      History     Chief Complaint   Patient presents with    Back Pain     Stated Complaint: Back Pain    Subjective:   HPI    Patient states he has a history of intermittent back pain and occasionally was on his leg for some time now.  However it is more persistent for the last week or so.  No numbness no weakness, no falls no trauma.  No bowel or bladder issues.  He went to immediate care was given muscle laxer and a steroid Dosepak.  Notes his back pain is better but he still has a pain going down his leg.    Objective:     Past Medical History:    Essential hypertension    GOUT    HYPERTENSION              Past Surgical History:   Procedure Laterality Date    Colonoscopy  08/2016    two 3 mm rectosigmoid polyps     Colonoscopy,diagnostic  6/26/08    normal    Other surgical history  9-    Prostate Biopsy - Dr. SANTO    Other surgical history  08/31/2020    cystoscopy/[facility] Dr. Echeverria                Social History     Socioeconomic History    Marital status:    Tobacco Use    Smoking status: Never    Smokeless tobacco: Never   Vaping Use    Vaping status: Never Used   Substance and Sexual Activity    Alcohol use: No    Drug use: Never   Other Topics Concern    Caffeine Concern Yes     Comment: coffee 2 cups daily    Exercise Yes     Comment: walking                  Physical Exam     ED Triage Vitals   BP 10/04/24 1045 (!) 109/91   Pulse 10/04/24 1045 79   Resp 10/04/24 1045 22   Temp 10/04/24 1051 97.8 °F (36.6 °C)   Temp src 10/04/24 1051 Temporal   SpO2 10/04/24 1045 96 %   O2 Device 10/04/24 1045 None (Room air)       Current Vitals:   Vital Signs  BP: 107/68  Pulse: 75  Resp: 22  Temp: 97.8 °F (36.6 °C)  Temp src: Temporal  MAP (mmHg): 81    Oxygen Therapy  SpO2: 94 %  O2 Device: None (Room air)        Physical Exam    Physical Exam   Constitutional: Awake, alert, well appearing  Head: Normocephalic and atraumatic.   Eyes: Conjunctivae are  normal. Pupils are equal, round, and reactive to light.   Neck: Normal range of motion. No JVD  Cardiovascular: Normal rate, regular rhythm  Pulmonary/Chest: Normal effort.  No accessory muscle use.  No cyanosis.  Abdominal: Soft. Not distended.  Neurological: Pt is alert and oriented to person, place, and time. no cranial nerve deficits. Speech fluent      No midline spinal tenderness along cervical, thoracic, lumbar, sacral spine.  No pain to percussion.  5 out of 5 strength with bilateral hip flexion, hip extension, knee flexion, knee extension.  5 out of 5 strength with plantar flexion and dorsiflexion of ankle.  5 out of 5 strength with bilateral EHL.    Babinski's downgoing bilaterally.    ED Course   Labs Reviewed - No data to display                MDM        Records from previous encounters were reviewed from : [facility] and It was noted that the x-ray of his lumbar spine did not have any fractures, had some degenerative changes  Differential diagnosis: Sciatica, lumbar to colopathy, degenerative disc disease all considered  -Continue Medrol Dosepak  -Stop Flexeril  -Trial gabapentin, prescription sent to pharmacy    -Outpatient spine follow-up                    Medical Decision Making      Disposition and Plan     Clinical Impression:  1. Lumbar radiculopathy         Disposition:  Discharge  10/4/2024 11:16 am    Follow-up:  [address] [place] [address] [place] [id number] [phone]  Call  choose option 2 for neurosurgery or pain follow up          Medications Prescribed:  Discharge Medication List as of 10/4/2024 11:38 AM        START taking these medications    Details   gabapentin 300 MG Oral Cap Take 1 capsule (300 mg total) by mouth in the morning and 1 capsule (300 mg total) before bedtime. Do all this for 21 days., Normal, Disp-42 capsule, R-0                 Supplementary Documentation:

## 2024-10-07 ENCOUNTER — HOSPITAL ENCOUNTER (OUTPATIENT)
Facility: HOSPITAL | Age: 69
Setting detail: OBSERVATION
Discharge: HOME HEALTH | End: 2024-10-08
Attending: EMERGENCY MEDICINE | Admitting: STUDENT IN AN ORGANIZED HEALTH CARE EDUCATION/TRAINING PROGRAM
Payer: MEDICARE

## 2024-10-07 ENCOUNTER — APPOINTMENT (OUTPATIENT)
Dept: MRI IMAGING | Facility: HOSPITAL | Age: 69
End: 2024-10-07
Attending: EMERGENCY MEDICINE
Payer: MEDICARE

## 2024-10-07 DIAGNOSIS — M51.369 BULGING LUMBAR DISC: ICD-10-CM

## 2024-10-07 DIAGNOSIS — M54.16 LUMBAR RADICULOPATHY: Primary | ICD-10-CM

## 2024-10-07 PROCEDURE — 72148 MRI LUMBAR SPINE W/O DYE: CPT | Performed by: EMERGENCY MEDICINE

## 2024-10-07 PROCEDURE — 99222 1ST HOSP IP/OBS MODERATE 55: CPT | Performed by: STUDENT IN AN ORGANIZED HEALTH CARE EDUCATION/TRAINING PROGRAM

## 2024-10-07 RX ORDER — HYDROCODONE BITARTRATE AND ACETAMINOPHEN 5; 325 MG/1; MG/1
2 TABLET ORAL EVERY 4 HOURS PRN
Status: DISCONTINUED | OUTPATIENT
Start: 2024-10-07 | End: 2024-10-08

## 2024-10-07 RX ORDER — MORPHINE SULFATE 4 MG/ML
4 INJECTION, SOLUTION INTRAMUSCULAR; INTRAVENOUS EVERY 30 MIN PRN
Status: ACTIVE | OUTPATIENT
Start: 2024-10-07 | End: 2024-10-07

## 2024-10-07 RX ORDER — MELATONIN
3 NIGHTLY PRN
Status: DISCONTINUED | OUTPATIENT
Start: 2024-10-07 | End: 2024-10-08

## 2024-10-07 RX ORDER — HYDROCODONE BITARTRATE AND ACETAMINOPHEN 5; 325 MG/1; MG/1
1 TABLET ORAL EVERY 4 HOURS PRN
Status: DISCONTINUED | OUTPATIENT
Start: 2024-10-07 | End: 2024-10-08

## 2024-10-07 RX ORDER — METHYLPREDNISOLONE 4 MG/1
4 TABLET ORAL
Status: DISCONTINUED | OUTPATIENT
Start: 2024-10-10 | End: 2024-10-08

## 2024-10-07 RX ORDER — METHYLPREDNISOLONE 4 MG/1
4 TABLET ORAL
Status: DISCONTINUED | OUTPATIENT
Start: 2024-10-09 | End: 2024-10-08

## 2024-10-07 RX ORDER — KETOROLAC TROMETHAMINE 30 MG/ML
30 INJECTION, SOLUTION INTRAMUSCULAR; INTRAVENOUS ONCE
Status: DISCONTINUED | OUTPATIENT
Start: 2024-10-07 | End: 2024-10-07

## 2024-10-07 RX ORDER — METHYLPREDNISOLONE 4 MG/1
4 TABLET ORAL
Status: DISCONTINUED | OUTPATIENT
Start: 2024-10-08 | End: 2024-10-08

## 2024-10-07 RX ORDER — METHYLPREDNISOLONE 4 MG/1
8 TABLET ORAL
Status: COMPLETED | OUTPATIENT
Start: 2024-10-07 | End: 2024-10-07

## 2024-10-07 RX ORDER — DEXAMETHASONE SODIUM PHOSPHATE 4 MG/ML
10 VIAL (ML) INJECTION ONCE
Status: COMPLETED | OUTPATIENT
Start: 2024-10-07 | End: 2024-10-07

## 2024-10-07 RX ORDER — ROSUVASTATIN CALCIUM 5 MG/1
10 TABLET, COATED ORAL NIGHTLY
Status: DISCONTINUED | OUTPATIENT
Start: 2024-10-07 | End: 2024-10-08

## 2024-10-07 RX ORDER — MORPHINE SULFATE 4 MG/ML
4 INJECTION, SOLUTION INTRAMUSCULAR; INTRAVENOUS ONCE
Status: DISCONTINUED | OUTPATIENT
Start: 2024-10-07 | End: 2024-10-08

## 2024-10-07 RX ORDER — TADALAFIL 5 MG/1
5 TABLET ORAL
Status: DISCONTINUED | OUTPATIENT
Start: 2024-10-07 | End: 2024-10-08

## 2024-10-07 RX ORDER — ECHINACEA PURPUREA EXTRACT 125 MG
1 TABLET ORAL
Status: DISCONTINUED | OUTPATIENT
Start: 2024-10-07 | End: 2024-10-08

## 2024-10-07 RX ORDER — ONDANSETRON 2 MG/ML
4 INJECTION INTRAMUSCULAR; INTRAVENOUS EVERY 6 HOURS PRN
Status: DISCONTINUED | OUTPATIENT
Start: 2024-10-07 | End: 2024-10-08

## 2024-10-07 RX ORDER — TAMSULOSIN HYDROCHLORIDE 0.4 MG/1
0.4 CAPSULE ORAL
Status: DISCONTINUED | OUTPATIENT
Start: 2024-10-08 | End: 2024-10-08

## 2024-10-07 RX ORDER — TAMSULOSIN HYDROCHLORIDE 0.4 MG/1
0.4 CAPSULE ORAL
COMMUNITY
Start: 2024-07-15

## 2024-10-07 RX ORDER — METHYLPREDNISOLONE 4 MG/1
4 TABLET ORAL
Status: COMPLETED | OUTPATIENT
Start: 2024-10-07 | End: 2024-10-07

## 2024-10-07 RX ORDER — POLYETHYLENE GLYCOL 3350 17 G/17G
17 POWDER, FOR SOLUTION ORAL DAILY PRN
Status: DISCONTINUED | OUTPATIENT
Start: 2024-10-07 | End: 2024-10-08

## 2024-10-07 RX ORDER — TADALAFIL 5 MG/1
5 TABLET ORAL
COMMUNITY
Start: 2024-08-28

## 2024-10-07 RX ORDER — METOCLOPRAMIDE HYDROCHLORIDE 5 MG/ML
10 INJECTION INTRAMUSCULAR; INTRAVENOUS EVERY 8 HOURS PRN
Status: DISCONTINUED | OUTPATIENT
Start: 2024-10-07 | End: 2024-10-08

## 2024-10-07 RX ORDER — METHYLPREDNISOLONE 4 MG/1
4 TABLET ORAL
Status: DISCONTINUED | OUTPATIENT
Start: 2024-10-12 | End: 2024-10-08

## 2024-10-07 RX ORDER — METHYLPREDNISOLONE 4 MG/1
8 TABLET ORAL
Status: DISCONTINUED | OUTPATIENT
Start: 2024-10-08 | End: 2024-10-08

## 2024-10-07 RX ORDER — ALLOPURINOL 100 MG/1
100 TABLET ORAL
COMMUNITY

## 2024-10-07 RX ORDER — CYCLOBENZAPRINE HCL 5 MG
5 TABLET ORAL 3 TIMES DAILY PRN
Status: DISCONTINUED | OUTPATIENT
Start: 2024-10-07 | End: 2024-10-08

## 2024-10-07 RX ORDER — GABAPENTIN 300 MG/1
300 CAPSULE ORAL 2 TIMES DAILY
Status: DISCONTINUED | OUTPATIENT
Start: 2024-10-07 | End: 2024-10-08

## 2024-10-07 RX ORDER — BISACODYL 10 MG
10 SUPPOSITORY, RECTAL RECTAL
Status: DISCONTINUED | OUTPATIENT
Start: 2024-10-07 | End: 2024-10-08

## 2024-10-07 RX ORDER — ENOXAPARIN SODIUM 100 MG/ML
40 INJECTION SUBCUTANEOUS NIGHTLY
Status: DISCONTINUED | OUTPATIENT
Start: 2024-10-07 | End: 2024-10-08

## 2024-10-07 RX ORDER — SODIUM PHOSPHATE, DIBASIC AND SODIUM PHOSPHATE, MONOBASIC 7; 19 G/230ML; G/230ML
1 ENEMA RECTAL ONCE AS NEEDED
Status: DISCONTINUED | OUTPATIENT
Start: 2024-10-07 | End: 2024-10-08

## 2024-10-07 RX ORDER — METHYLPREDNISOLONE 4 MG/1
4 TABLET ORAL NIGHTLY
Status: DISCONTINUED | OUTPATIENT
Start: 2024-10-11 | End: 2024-10-08

## 2024-10-07 RX ORDER — PANTOPRAZOLE SODIUM 40 MG/1
40 TABLET, DELAYED RELEASE ORAL
Status: DISCONTINUED | OUTPATIENT
Start: 2024-10-08 | End: 2024-10-08

## 2024-10-07 RX ORDER — ALLOPURINOL 100 MG/1
100 TABLET ORAL
Status: DISCONTINUED | OUTPATIENT
Start: 2024-10-08 | End: 2024-10-08

## 2024-10-07 RX ORDER — ACETAMINOPHEN 500 MG
1000 TABLET ORAL EVERY 8 HOURS PRN
Status: DISCONTINUED | OUTPATIENT
Start: 2024-10-07 | End: 2024-10-08

## 2024-10-07 RX ORDER — LOSARTAN POTASSIUM 100 MG/1
100 TABLET ORAL DAILY
Status: DISCONTINUED | OUTPATIENT
Start: 2024-10-08 | End: 2024-10-08

## 2024-10-07 RX ORDER — METHYLPREDNISOLONE 4 MG/1
4 TABLET ORAL
Status: DISCONTINUED | OUTPATIENT
Start: 2024-10-11 | End: 2024-10-08

## 2024-10-07 RX ORDER — SENNOSIDES 8.6 MG
17.2 TABLET ORAL NIGHTLY PRN
Status: DISCONTINUED | OUTPATIENT
Start: 2024-10-07 | End: 2024-10-08

## 2024-10-07 RX ORDER — FINASTERIDE 5 MG/1
5 TABLET, FILM COATED ORAL DAILY
Status: DISCONTINUED | OUTPATIENT
Start: 2024-10-07 | End: 2024-10-08

## 2024-10-07 NOTE — ED PROVIDER NOTES
Patient Seen in: [facility] Emergency Department      History     Chief Complaint   Patient presents with    Leg Pain     Stated Complaint: Leg pain    Subjective:   HPI    69-year-old male presents reporting pain in the left lower back and buttock area that radiates down the left leg.  He was seen in this department 3 days ago for the same and went to an immediate care last week.  He has been diagnosed with lumbar radiculopathy/sciatica.  Was originally placed on a Medrol Dosepak which she finished the other day and was prescribed a muscle relaxer which he says he has not been taking because they told him to take it as needed and he does not think he needs it.  However, he is here in the emergency room today stating that the pain is so severe he cannot walk at home anymore.  He says when he tries to stand up the pain is too intense.  Denies any bowel or bladder retention or incontinence    Objective:     Past Medical History:    Essential hypertension    GOUT    HYPERTENSION              Past Surgical History:   Procedure Laterality Date    Colonoscopy  08/2016    two 3 mm rectosigmoid polyps     Colonoscopy,diagnostic  6/26/08    normal    Other surgical history  9-    Prostate Biopsy - Dr. SANTO    Other surgical history  08/31/2020    cystoscopy/[facility] Dr. Echeverria                Social History     Socioeconomic History    Marital status:    Tobacco Use    Smoking status: Never    Smokeless tobacco: Never   Vaping Use    Vaping status: Never Used   Substance and Sexual Activity    Alcohol use: No    Drug use: Never   Other Topics Concern    Caffeine Concern Yes     Comment: coffee 2 cups daily    Exercise Yes     Comment: walking                  Physical Exam     ED Triage Vitals   BP 10/07/24 0959 132/78   Pulse 10/07/24 0959 89   Resp 10/07/24 0959 16   Temp 10/07/24 1008 97.6 °F (36.4 °C)   Temp src 10/07/24 1008 Temporal   SpO2 10/07/24 0959 92 %   O2 Device 10/07/24 0959 None (Room air)        Current Vitals:   Vital Signs  BP: 120/79  Pulse: 61  Resp: 18  Temp: 97.6 °F (36.4 °C)  Temp src: Temporal  MAP (mmHg): 90    Oxygen Therapy  SpO2: 97 %  O2 Device: None (Room air)        Physical Exam  General:  Vitals as listed.  No acute distress   Lungs: good air exchange and clear   Heart: regular rate rhythm and no murmur   Abdomen: Soft and nontender.  No abdominal masses.  No peritoneal signs   Extremities: no edema.   Neuro: Alert oriented.  Sensation intact throughout.  Strength 5 out of 5 in bilateral lower extremities.  No foot drop.  Pain with active range of motion of the left leg and positive straight leg raise.  Skin: no rashes or nodules    ED Course   Labs Reviewed - No data to display         MRI SPINE LUMBAR (CPT=72148)    Result Date: 10/7/2024  PROCEDURE:  MRI SPINE LUMBAR (CPT=72148)  COMPARISON:  None.  INDICATIONS:  Left lower extremity paresthesia/radiculopathy  TECHNIQUE:  Multiplanar T1 and T2 weighted images including fat suppression sequences.  Images acquired in sagittal and axial planes.   PATIENT STATED HISTORY: (As transcribed by Technologist)  Patient states left leg discomfort woth lower back pain.    FINDINGS:  LUMBAR DISC LEVELS L1-L2:  Disc desiccation without significant disc height loss or disc bulge.  No central canal or neural foraminal stenosis. L2-L3:  Disc desiccation with small, broad-based right foraminal disc protrusion.  No significant central canal or neural foraminal stenosis. L3-L4:  Disc desiccation with mild diffuse disc bulge.  No significant central canal or neural foraminal stenosis. L4-L5:  Disc desiccation with moderate, diffuse disc bulge, additionally with suspected focally extruded disc material of the left paracentral region measuring approximately 10 x 6 mm in cross-section over an 18 mm craniocaudal length (series 6, image 10, series 9, image 32).  This results in moderate/severe central canal stenosis, additionally with effacement of the  descending left L5 nerve root.  Facet arthrosis contributes to moderate right-sided and mild left-sided foraminal stenosis at this level. L5-S1:  Disc desiccation with moderate diffuse disc bulge.  No significant central canal stenosis.  Facet arthrosis contributes to moderate right-sided and mild left-sided foraminal stenosis.  PARASPINAL AREA:  No epidural or paraspinal mass or fluid collection.  Incidentally noted simple cortical cyst of the superior pole left kidney measuring 3.0 cm. BONY STRUCTURES:  There are 5 lumbar-type vertebral bodies which maintain normal height.  Mild straightening of the usual lumbar lordosis.  No spondylolisthesis.  Normal marrow signal.  No focal osseous lesions. CORD/CAUDA EQUINA:  Normal caliber, contour, and signal intensity.             CONCLUSION:   Multilevel degenerative changes of the spine, as above.  This is most notable at the L4-5 level where there is a combination of diffuse disc bulge and suspected focally extruded disc material of the left paracentral region (10 x 6 x 18 mm) resulting in moderate/severe central canal stenosis with effacement of the descending left L5 nerve root.  Correlate with radicular symptoms.    LOCATION:  [facility]   Dictated by (CST): Jose Brewster MD on 10/07/2024 at 3:18 PM     Finalized by (CST): Jose Brewster MD on 10/07/2024 at 3:27 PM             MDM      69-year-old male presents reporting left lower back pain radiating down the leg and says the pain is so severe he cannot walk.  No neurologic deficits on exam.    Additional history obtained by outpatient clinic documentation from [facility] which describes \"pain started 2 weeks ago and was slowly improving until a fall 2 days ago\".     Differential includes but is not limited to lumbar radiculopathy, cauda equina, a life threat.    MRI of the lumbar spine ordered for further evaluation.  Patient reports the pain is so severe he is unable to ambulate at home and had to be  brought to the hospital by ambulance.  He is denying any bowel or bladder retention or incontinence.  He has normal sensation on exam.  His strength is intact.    My independent interpretation of MRI of the spine is that there is no obvious compression fracture.    The radiology reports a L4-L5 disc bulge with extruded disc material resulting in moderate/severe central canal stenosis.  Reviewed this finding with Dr. Multani from spine.  Plan is to admit the patient for pain management and further evaluation of findings.  The patient is agreeable.  Admitted to the [facility].  Currently neurologically intact and no evidence of cauda equina.        Medical Decision Making      Disposition and Plan     Clinical Impression:  1. Lumbar radiculopathy    2. Bulging lumbar disc         Disposition:  Admit  10/7/2024  3:55 pm    Follow-up:  No follow-up provider specified.        Medications Prescribed:  Current Discharge Medication List              Supplementary Documentation:         Hospital Problems       Present on Admission  Date Reviewed: 4/24/2024            ICD-10-CM Noted POA    * (Principal) Lumbar radiculopathy M54.16 10/7/2024 Unknown

## 2024-10-07 NOTE — ED QUICK NOTES
Rounding Completed    Plan of Care reviewed. Waiting for MRI.  Elimination needs assessed.  Provided urinal.    Bed is locked and in lowest position. Call light within reach.

## 2024-10-07 NOTE — ED INITIAL ASSESSMENT (HPI)
Pt in via EMS from home. Pt was dx with sciatica last Tuesday when he went to immediate care and got xrays done of his lower back and left hip. Today he complains of left leg pain and is requesting an MRI.

## 2024-10-07 NOTE — ED QUICK NOTES
Rounding Completed    Plan of Care reviewed. Waiting for MRI.  Elimination needs assessed.  Provided water.    Bed is locked and in lowest position. Call light within reach.

## 2024-10-07 NOTE — ED QUICK NOTES
Orders for admission, patient is aware of plan and ready to go upstairs. Any questions, please call ED RN Vianey at extension [phone].     Patient Covid vaccination status: Fully vaccinated     COVID Test Ordered in ED: None    COVID Suspicion at Admission: N/A    Running Infusions:  None    Mental Status/LOC at time of transport: A+Ox4    Other pertinent information:   CIWA score: N/A   NIH score:  N/A

## 2024-10-08 VITALS
DIASTOLIC BLOOD PRESSURE: 67 MMHG | BODY MASS INDEX: 23.3 KG/M2 | OXYGEN SATURATION: 94 % | SYSTOLIC BLOOD PRESSURE: 122 MMHG | WEIGHT: 145 LBS | TEMPERATURE: 99 F | RESPIRATION RATE: 20 BRPM | HEART RATE: 83 BPM | HEIGHT: 66 IN

## 2024-10-08 LAB
ANION GAP SERPL CALC-SCNC: 6 MMOL/L (ref 0–18)
BASOPHILS # BLD AUTO: 0 X10(3) UL (ref 0–0.2)
BASOPHILS NFR BLD AUTO: 0 %
BUN BLD-MCNC: 24 MG/DL (ref 9–23)
CALCIUM BLD-MCNC: 9.3 MG/DL (ref 8.7–10.4)
CHLORIDE SERPL-SCNC: 106 MMOL/L (ref 98–112)
CO2 SERPL-SCNC: 26 MMOL/L (ref 21–32)
CREAT BLD-MCNC: 1.17 MG/DL
EGFRCR SERPLBLD CKD-EPI 2021: 67 ML/MIN/1.73M2 (ref 60–?)
EOSINOPHIL # BLD AUTO: 0 X10(3) UL (ref 0–0.7)
EOSINOPHIL NFR BLD AUTO: 0 %
ERYTHROCYTE [DISTWIDTH] IN BLOOD BY AUTOMATED COUNT: 13 %
GLUCOSE BLD-MCNC: 161 MG/DL (ref 70–99)
HCT VFR BLD AUTO: 45.6 %
HGB BLD-MCNC: 15.6 G/DL
IMM GRANULOCYTES # BLD AUTO: 0.01 X10(3) UL (ref 0–1)
IMM GRANULOCYTES NFR BLD: 0.2 %
LYMPHOCYTES # BLD AUTO: 1 X10(3) UL (ref 1–4)
LYMPHOCYTES NFR BLD AUTO: 19 %
MAGNESIUM SERPL-MCNC: 2 MG/DL (ref 1.6–2.6)
MCH RBC QN AUTO: 32.9 PG (ref 26–34)
MCHC RBC AUTO-ENTMCNC: 34.2 G/DL (ref 31–37)
MCV RBC AUTO: 96.2 FL
MONOCYTES # BLD AUTO: 0.1 X10(3) UL (ref 0.1–1)
MONOCYTES NFR BLD AUTO: 1.9 %
NEUTROPHILS # BLD AUTO: 4.15 X10 (3) UL (ref 1.5–7.7)
NEUTROPHILS # BLD AUTO: 4.15 X10(3) UL (ref 1.5–7.7)
NEUTROPHILS NFR BLD AUTO: 78.9 %
OSMOLALITY SERPL CALC.SUM OF ELEC: 294 MOSM/KG (ref 275–295)
PLATELET # BLD AUTO: 185 10(3)UL (ref 150–450)
POTASSIUM SERPL-SCNC: 4.3 MMOL/L (ref 3.5–5.1)
RBC # BLD AUTO: 4.74 X10(6)UL
SODIUM SERPL-SCNC: 138 MMOL/L (ref 136–145)
WBC # BLD AUTO: 5.3 X10(3) UL (ref 4–11)

## 2024-10-08 PROCEDURE — 99239 HOSP IP/OBS DSCHRG MGMT >30: CPT | Performed by: INTERNAL MEDICINE

## 2024-10-08 RX ORDER — MORPHINE SULFATE 2 MG/ML
2 INJECTION, SOLUTION INTRAMUSCULAR; INTRAVENOUS EVERY 2 HOUR PRN
Status: DISCONTINUED | OUTPATIENT
Start: 2024-10-08 | End: 2024-10-08

## 2024-10-08 RX ORDER — HYDROCODONE BITARTRATE AND ACETAMINOPHEN 5; 325 MG/1; MG/1
1 TABLET ORAL EVERY 4 HOURS PRN
Qty: 25 TABLET | Refills: 0 | Status: SHIPPED | OUTPATIENT
Start: 2024-10-08

## 2024-10-08 RX ORDER — METHYLPREDNISOLONE 4 MG/1
TABLET ORAL
Qty: 21 TABLET | Refills: 0 | Status: SHIPPED | OUTPATIENT
Start: 2024-10-08

## 2024-10-08 RX ORDER — PANTOPRAZOLE SODIUM 40 MG/1
40 TABLET, DELAYED RELEASE ORAL
Qty: 10 TABLET | Refills: 0 | Status: SHIPPED | OUTPATIENT
Start: 2024-10-08 | End: 2024-10-13

## 2024-10-08 RX ORDER — MORPHINE SULFATE 4 MG/ML
4 INJECTION, SOLUTION INTRAMUSCULAR; INTRAVENOUS EVERY 2 HOUR PRN
Status: DISCONTINUED | OUTPATIENT
Start: 2024-10-08 | End: 2024-10-08

## 2024-10-08 RX ORDER — MORPHINE SULFATE 2 MG/ML
1 INJECTION, SOLUTION INTRAMUSCULAR; INTRAVENOUS EVERY 2 HOUR PRN
Status: DISCONTINUED | OUTPATIENT
Start: 2024-10-08 | End: 2024-10-08

## 2024-10-08 RX ORDER — CYCLOBENZAPRINE HCL 5 MG
5 TABLET ORAL 3 TIMES DAILY PRN
Qty: 20 TABLET | Refills: 0 | Status: SHIPPED | OUTPATIENT
Start: 2024-10-08

## 2024-10-08 NOTE — DISCHARGE SUMMARY
[facility]     Juliano Gar Patient Status:  Observation    3/20/1955 MRN [id number]   Location [facility] 3SW-A Attending Bhaskar Barrera DO   Hosp Day # 0 PCP Martín Perez DO     Date of Admission: 10/7/2024  Date of Discharge: 10/8/2024  Discharge Disposition: Home or Self Care    Discharge Diagnosis:   #L4-5 disc bulge with moderate/severe spinal canal stenosis  #Lumbar radiculopathy  #Hx of UGIB 2023  #Hyperlipidemia  #Hypertension  #Gout   #BPH    History of Present Illness: Juliano Gar is a 69 year old male with ho gout, HTN. The patient sustained  fall last week, Xray without acute findings and he was DC home from ER. Pt comes back today with back pain, LLE pain- numbness. He is unable to ambulate as a result. No repeat falls. No numbness to buttock, no stool/urine incontinence.     Brief Synopsis: MRI showed L4-5 disc bulge with moderate to severe spinal canal stenosis.  He was started on Medrol Dosepak, Flexeril and narcotic pain medications.  He felt better and was seen by spine surgery as well as PT/OT.  Plans for outpatient follow-up.    Lace+ Score: 37  59-90 High Risk  29-58 Medium Risk  0-28   Low Risk       TCM Follow-Up Recommendation:  LACE 29-58: Moderate Risk of readmission after discharge from the hospital.    Procedures during hospitalization:   None     Incidental or significant findings and recommendations (brief descriptions):  None     Lab/Test results pending at Discharge:   None    Consultants:  Spine surgery    Discharge Medication List:     Discharge Medications        START taking these medications        Instructions Prescription details   cyclobenzaprine 5 MG Tabs  Commonly known as: Flexeril      Take 1 tablet (5 mg total) by mouth 3 (three) times daily as needed for Muscle spasms.   Quantity: 20 tablet  Refills: 0     HYDROcodone-acetaminophen 5-325 MG Tabs  Commonly known as: Norco      Take 1 tablet by mouth every 4 (four) hours as  needed.   Quantity: 25 tablet  Refills: 0     methylPREDNISolone 4 MG Tbpk  Commonly known as: Medrol Dosepak      Take as directed on dose pack instructions but start on day 2.   Quantity: 21 tablet  Refills: 0     pantoprazole 40 MG Tbec  Commonly known as: Protonix      Take 1 tablet (40 mg total) by mouth 2 (two) times daily before meals for 5 days.   Stop taking on: October 13, 2024  Quantity: 10 tablet  Refills: 0            CONTINUE taking these medications        Instructions Prescription details   allopurinol 100 MG Tabs  Commonly known as: Zyloprim      Take 1 tablet (100 mg total) by mouth Noon.   Refills: 0     CALCIUM-MAGNESIUM-VITAMIN D OR      Take 1 tablet by mouth daily. 1000mg/500mg/10mcg   Refills: 0     finasteride 5 MG Tabs  Commonly known as: Proscar      Take 1 tablet (5 mg total) by mouth daily.   Refills: 0     FISH OIL OR      Take 1 capsule by mouth daily.   Refills: 0     gabapentin 300 MG Caps  Commonly known as: Neurontin      Take 1 capsule (300 mg total) by mouth in the morning and 1 capsule (300 mg total) before bedtime. Do all this for 21 days.   Stop taking on: October 25, 2024  Quantity: 42 capsule  Refills: 0     losartan 100 MG Tabs  Commonly known as: Cozaar      Take 1 tablet (100 mg total) by mouth daily.   Refills: 0     rosuvastatin 10 MG Tabs  Commonly known as: Crestor      Take 1 tablet (10 mg total) by mouth daily.   Refills: 0     tadalafil 5 MG Tabs  Commonly known as: CIALIS      Take 1 tablet (5 mg total) by mouth After dinner.   Refills: 0     tamsulosin 0.4 MG Caps  Commonly known as: Flomax      Take 1 capsule (0.4 mg total) by mouth After dinner.   Refills: 0               Where to Get Your Medications        These medications were sent to [id number] - [place], [place] - [address] [phone], [phone]  [address] [place] [address]      Phone: [phone]   cyclobenzaprine 5 MG Tabs  HYDROcodone-acetaminophen 5-325 MG  Tabs  methylPREDNISolone 4 MG Tbpk  pantoprazole 40 MG Tbec         [place]: [place]    Follow-up appointment:   Martín Perez,   [address]  [place] [address] [phone]    Follow up in 1 week(s)      Helder Lane MD  [address]  [address]  [facility] [address] [phone]    Schedule an appointment as soon as possible for a visit      USHA Torrez DO  [address]  [facility] [address] [phone]    Schedule an appointment as soon as possible for a visit      Appointments for Next 30 Days 10/8/2024 - 11/7/2024      None            Vital signs:  Temp:  [97.8 °F (36.6 °C)-98.6 °F (37 °C)] 98.6 °F (37 °C)  Pulse:  [58-95] 83  Resp:  [18-20] 20  BP: ()/(63-98) 122/67  SpO2:  [92 %-100 %] 94 %    Physical Exam:    See progress note dated same day.  -----------------------------------------------------------------------------------------------  PATIENT DISCHARGE INSTRUCTIONS: See electronic chart    Bhaskar Barrera DO    Time spent:  32 minutes

## 2024-10-08 NOTE — PLAN OF CARE
A&Ox4, VSS on room air. Pain is better controlled today. Ambulating with walker, tolerating well. Plan to DC home today with [facility]. Follow up outpatient.

## 2024-10-08 NOTE — CM/SW NOTE
10/08/24 0120   Choice of Post-Acute Provider   Informed patient of right to choose their preferred provider Yes   List of appropriate post-acute services provided to patient/family with quality data Yes   Patient/family choice [facility]   Information given to Patient   Residential HHC/Hospice financial disclosure given Yes       Met with pt at bedside to discuss DC planning.  List of accepting HH agencies given.  Pt stated he will stay with his brother in [place]: [address]  [address].  Updated HH agencies.  [facility] and [facility] confirmed they can see pt in [place].  Pt selected [facility].  His brother will provide transport home.  No other needs at this time.  / to remain available for support and/or discharge planning.     Genet Henderson, [facility]  Discharge Planner  [phone]

## 2024-10-08 NOTE — CONSULTS
[facility]  [facility] Neurosurgery Consult    Juliano Gar Patient Status:  Observation    3/20/1955 MRN [id number]   Location [facility] 3SW-A Attending Bhaskar Barrera DO   Hosp Day # 0 PCP Martín Perez DO     REASON FOR CONSULTATION:  Lumbar radiculopathy    HISTORY OF PRESENT ILLNESS     Juliano Gar is a pleasant 69 year old male with PMH of HTN and gout who presented to ED with intractable low back pain. Patient endorses 1 week of atraumatic LBP. He presented to the ED for this complaint on 10/4 and was discharged with oral steroids and a muscle relaxant. On 10/6, he developed shooting pain down the LLE in addition to pre-existing LBP after he missed a step and fell down the stairs at home. His pain continued to progress, prompting him to return to ED yesterday. MRI lumbar spine demonstrates a diffuse disc bulge with extruded disc material on the left at L4-5, resulting in moderate to severe central canal stenosis with effacement of the left L5 descending nerve root. Neurosurgery is consulted for this.    On exam, patient reports left-sided LBP with radiation into the L hip and down the lateral LLE into the L great toe. He has intermittent numbness and tingling in this distribution. He has perceived LLE weakness and had difficulty bearing weight secondary to L leg pain. Denies changes in bowel/bladder habits or saddle anesthesia.  No prior history of spine surgeries, injections, or dedicated spine PT. Wife at bedside.     PAST MEDICAL HISTORY     Past Medical History:    Essential hypertension    GOUT    HYPERTENSION     PAST SURGICAL HISTORY:  Past Surgical History:   Procedure Laterality Date    Colonoscopy  2016    two 3 mm rectosigmoid polyps     Colonoscopy,diagnostic  08    normal    Other surgical history  2011    Prostate Biopsy - Dr. SANTO    Other surgical history  2020    cystoscopy/TRUS Dr. Echeverria     FAMILY HISTORY:  family history includes Heart Disorder in his mother;  Hypertension in his father and mother.    SOCIAL HISTORY:   reports that he has never smoked. He has never used smokeless tobacco. He reports that he does not drink alcohol and does not use drugs.    ALLERGIES     No Known Allergies    MEDICATIONS     Medications Prior to Admission   Medication Sig Dispense Refill Last Dose    tamsulosin 0.4 MG Oral Cap Take 1 capsule (0.4 mg total) by mouth After dinner.   10/6/2024 at 1800    tadalafil 5 MG Oral Tab Take 1 tablet (5 mg total) by mouth After dinner.   10/6/2024 at 1800    allopurinol 100 MG Oral Tab Take 1 tablet (100 mg total) by mouth Noon.   10/6/2024 at 1200    gabapentin 300 MG Oral Cap Take 1 capsule (300 mg total) by mouth in the morning and 1 capsule (300 mg total) before bedtime. Do all this for 21 days. 42 capsule 0 10/6/2024 at PM    rosuvastatin 10 MG Oral Tab Take 1 tablet (10 mg total) by mouth daily.   10/7/2024 at AM    losartan 100 MG Oral Tab Take 1 tablet (100 mg total) by mouth daily.   10/7/2024 at AM    CALCIUM-MAGNESIUM-VITAMIN D OR Take 1 tablet by mouth daily. 1000mg/500mg/10mcg   Past Week    finasteride 5 MG Oral Tab Take 1 tablet (5 mg total) by mouth daily.   10/7/2024 at AM    Omega-3 Fatty Acids (FISH OIL OR) Take 1 capsule by mouth daily.   Past Week     Current Facility-Administered Medications   Medication Dose Route Frequency    morphINE PF 2 MG/ML injection 1 mg  1 mg Intravenous Q2H PRN    Or    morphINE PF 2 MG/ML injection 2 mg  2 mg Intravenous Q2H PRN    Or    morphINE PF 4 MG/ML injection 4 mg  4 mg Intravenous Q2H PRN    enoxaparin (Lovenox) 40 MG/0.4ML SUBQ injection 40 mg  40 mg Subcutaneous Nightly    acetaminophen (Tylenol Extra Strength) tab 1,000 mg  1,000 mg Oral Q8H PRN    melatonin tab 3 mg  3 mg Oral Nightly PRN    polyethylene glycol (PEG 3350) (Miralax) 17 g oral packet 17 g  17 g Oral Daily PRN    sennosides (Senokot) tab 17.2 mg  17.2 mg Oral Nightly PRN    bisacodyl (Dulcolax) 10 MG rectal suppository 10 mg   10 mg Rectal Daily PRN    fleet enema (Fleet) rectal enema 133 mL  1 enema Rectal Once PRN    ondansetron (Zofran) 4 MG/2ML injection 4 mg  4 mg Intravenous Q6H PRN    metoclopramide (Reglan) 5 mg/mL injection 10 mg  10 mg Intravenous Q8H PRN    sodium chloride (Saline Mist) 0.65 % nasal solution 1 spray  1 spray Each Nare Q3H PRN    glycerin-hypromellose- (Artificial Tears) 0.2-0.2-1 % ophthalmic solution 1 drop  1 drop Both Eyes QID PRN    methylPREDNISolone (Medrol) tab 4 mg  4 mg Oral TID CC    methylPREDNISolone (Medrol) tab 8 mg  8 mg Oral nightly    [START ON 10/9/2024] methylPREDNISolone (Medrol) tab 4 mg  4 mg Oral TID CC and HS    [START ON 10/10/2024] methylPREDNISolone (Medrol) tab 4 mg  4 mg Oral Daily with breakfast    [START ON 10/10/2024] methylPREDNISolone (Medrol) tab 4 mg  4 mg Oral Daily with lunch    [START ON 10/10/2024] methylPREDNISolone (Medrol) tab 4 mg  4 mg Oral nightly    [START ON 10/11/2024] methylPREDNISolone (Medrol) tab 4 mg  4 mg Oral Daily with breakfast    [START ON 10/11/2024] methylPREDNISolone (Medrol) tab 4 mg  4 mg Oral Nightly    [START ON 10/12/2024] methylPREDNISolone (Medrol) tab 4 mg  4 mg Oral Daily with breakfast    cyclobenzaprine (Flexeril) tab 5 mg  5 mg Oral TID PRN    HYDROcodone-acetaminophen (Norco) 5-325 MG per tab 1 tablet  1 tablet Oral Q4H PRN    Or    HYDROcodone-acetaminophen (Norco) 5-325 MG per tab 2 tablet  2 tablet Oral Q4H PRN    allopurinol (Zyloprim) tab 100 mg  100 mg Oral Noon    finasteride (Proscar) tab 5 mg  5 mg Oral Daily    gabapentin (Neurontin) cap 300 mg  300 mg Oral BID    losartan (Cozaar) tab 100 mg  100 mg Oral Daily    rosuvastatin (Crestor) tab 10 mg  10 mg Oral Nightly    tamsulosin (Flomax) cap 0.4 mg  0.4 mg Oral After dinner    pantoprazole (Protonix) DR tab 40 mg  40 mg Oral BID AC       REVIEW OF SYSTEMS     Comprehensive Review of Systems obtained, and is negative other than that mentioned in the History of Present  Illness.      PHYSICAL EXAMINATION     VITALS: /67 (BP Location: Right arm)   Pulse 83   Temp 98.6 °F (37 °C) (Oral)   Resp 20   Ht 66\"   Wt 145 lb (65.8 kg)   SpO2 94%   BMI 23.40 kg/m²     GENERAL:  No acute distress, non-toxic appearing, speech fluent, mood appropriate    HEENT:  Normocephalic, atraumatic    RESP: Non-labored, easy, even    CV: NSR on tele    NEUROLOGICAL:  Alert and oriented x3.  Sensation to light touch is intact bilaterally.  Gait deferred.   Strength 5/5 upper extremities.     UPPER EXTREMITY STRENGTH:    Deltoid  Biceps  Triceps     Finger abduction     Right 5 5 5 5 5     Left 5 5 5 5 5     LOWER EXTREMITY STRENGTH:    Iliospoas  Hamstrings  Quads  D-flexion  P-flexion EHL     Right 5 5 5 5 5 5     Left 5 5 5 5 5 5     DTRs:     Biceps    Triceps   Brachioradialis     Patellar     Ankle     Right       2+         2+            2+         2+        2+     Left       2+         2+             2+         2+        2+      DIAGNOSTIC DATA     Lab Results   Component Value Date    WBC 5.3 10/08/2024    HGB 15.6 10/08/2024    HCT 45.6 10/08/2024    .0 10/08/2024    CREATSERUM 1.17 10/08/2024    BUN 24 10/08/2024     10/08/2024    K 4.3 10/08/2024     10/08/2024    CO2 26.0 10/08/2024     10/08/2024    CA 9.3 10/08/2024    MG 2.0 10/08/2024     IMAGING     MRI SPINE LUMBAR (CPT=72148)    Result Date: 10/7/2024  CONCLUSION:   Multilevel degenerative changes of the spine, as above.  This is most notable at the L4-5 level where there is a combination of diffuse disc bulge and suspected focally extruded disc material of the left paracentral region (10 x 6 x 18 mm) resulting in moderate/severe central canal stenosis with effacement of the descending left L5 nerve root.  Correlate with radicular symptoms.    LOCATION:  [facility]   Dictated by (CST): Jose Brewster MD on 10/07/2024 at 3:18 PM     Finalized by (CST): Jose Brewster MD on 10/07/2024 at 3:27 PM          ASSESSMENT & PLAN     ASSESSMENT:  Lumbar radiculopathy  Left L4-5 disc extrusion     PLAN:  No acute surgical intervention is indicated at this time  Recommend conservative management with medications and mobilization  PT/OT  Medical management and pain control per Hospitalist  Recommend outpatient f/u with Pain Management for consideration of injections at L4-5  F/u with Neurosurgery in 1-2 weeks    Plan of care was discussed and imaging was reviewed at length with patient and wife.  Reinforced to patient that we will exhaust conservative measures before considering surgical intervention, which patient and wife are agreeable to.  We will see him as an outpatient for further discussion.  The above plan was discussed with Dr. Torrez and patient's RN.    Kenzie Yanez, APRN-NP  [facility]  10/8/2024     Total visit time: 20 minutes; More than 50% spent coordinating care, counseling, reviewing imaging and discussing medication therapy.     Is this a shared or split note between Advanced Practice Provider and Physician? Yes

## 2024-10-08 NOTE — PROGRESS NOTES
NURSING ADMISSION NOTE      Patient admitted via Cart.  Oriented to room.  Safety precautions initiated.  Bed in low position.  Call light in reach.  Pt. declined 2 RN skin assessment.

## 2024-10-08 NOTE — PROGRESS NOTES
[facility]   part of [facility]     Hospitalist Progress Note     Juliano Gar Patient Status:  Observation    3/20/1955 MRN [id number]   Location [facility] 3SW-A Attending Bhaskar Barrera DO   Hosp Day # 0 PCP Martín Perez DO     Chief Complaint: Back and leg pain    Subjective:     Patient states pain is controlled with medications. It is worse with any movement. Pain goes down LLE.     Objective:    Review of Systems:   A comprehensive review of systems was completed; pertinent positive and negatives stated in subjective.    Vital signs:  Temp:  [97.6 °F (36.4 °C)-98.6 °F (37 °C)] 98.6 °F (37 °C)  Pulse:  [58-95] 95  Resp:  [16-20] 20  BP: ()/(63-98) 122/74  SpO2:  [92 %-100 %] 95 %    Physical Exam:    General: No acute distress, awake and alert  Respiratory: No wheezes, no rhonchi  Cardiovascular: S1, S2, regular rate and rhythm  Abdomen: Soft, Non-tender, non-distended, positive bowel sounds  Neuro: 4/5 LLE. 5/5 RLE. 5/5 in bilateral UE. SILT  Extremities: No edema    Diagnostic Data:    Labs:  Recent Labs   Lab 10/08/24  0525   WBC 5.3   HGB 15.6   MCV 96.2   .0     Recent Labs   Lab 10/08/24  0525   *   BUN 24*   CREATSERUM 1.17   CA 9.3      K 4.3      CO2 26.0     Estimated Creatinine Clearance: 53.8 mL/min (based on SCr of 1.17 mg/dL).    No results for input(s): \"TROP\", \"TROPHS\", \"CK\" in the last 168 hours.    No results for input(s): \"PTP\", \"INR\" in the last 168 hours.     Microbiology  No results found for this visit on 10/07/24.    Imaging: Reviewed in Epic.    Medications:    enoxaparin  40 mg Subcutaneous Nightly    methylPREDNISolone  4 mg Oral TID CC    methylPREDNISolone  8 mg Oral nightly    [START ON 10/9/2024] methylPREDNISolone  4 mg Oral TID CC and HS    [START ON 10/10/2024] methylPREDNISolone  4 mg Oral Daily with breakfast    [START ON 10/10/2024] methylPREDNISolone  4 mg Oral Daily with lunch    [START ON 10/10/2024]  methylPREDNISolone  4 mg Oral nightly    [START ON 10/11/2024] methylPREDNISolone  4 mg Oral Daily with breakfast    [START ON 10/11/2024] methylPREDNISolone  4 mg Oral Nightly    [START ON 10/12/2024] methylPREDNISolone  4 mg Oral Daily with breakfast    allopurinol  100 mg Oral Noon    finasteride  5 mg Oral Daily    gabapentin  300 mg Oral BID    losartan  100 mg Oral Daily    rosuvastatin  10 mg Oral Nightly    tamsulosin  0.4 mg Oral After dinner    pantoprazole  40 mg Oral BID AC       Assessment & Plan:      #L4-5 disc bulge with moderate/severe spinal canal stenosis  #Lumbar radiculopathy  -Medrol dose pack  -Muscle relaxant, pain control. Continue home gabapentin  -Spine surgery consult  -PT/OT    #Hx of UGIB 5/2023  -No NSAIDS  -PPI BID while on medrol     #Hyperlipidemia  -Statin    #Hypertension  -Losartan    #Gout  -Allopurinol    #BPH  -Finasteride and Flomax    Bhaskar Barrera,     Supplementary Documentation:     Quality:  DVT Mechanical Prophylaxis:   SCDs,    DVT Pharmacologic Prophylaxis   Medication    enoxaparin (Lovenox) 40 MG/0.4ML SUBQ injection 40 mg   Code Status: Full  Duenas: No urinary catheter in place  MIRTHA: TBD    Discharge is dependent on: Clinical state, consultant recs  At this point Mr. Gar is expected to be discharge to: Home    The 21st Century Cures Act makes medical notes like these available to patients in the interest of transparency. Please be advised this is a medical document. Medical documents are intended to carry relevant information, facts as evident, and the clinical opinion of the practitioner. The medical note is intended as peer to peer communication and may appear blunt or direct. It is written in medical language and may contain abbreviations or verbiage that are unfamiliar.

## 2024-10-08 NOTE — CM/SW NOTE
10/08/24 1000   CM/SW Referral Data   Referral Source Social Work (self-referral)   Reason for Referral Discharge planning   Informant EMR;Clinical Staff Member   Discharge Needs   Anticipated D/C needs Home health care       Patient is a 68 y/o man admitted with back and leg pain.  Informed by therapy that pt would benefit from C services at DC.  Referral sent to  providers via [employer].  Await responses for further DC Planning.  / to remain available for support and/or discharge planning.     Genet Henderson [facility]  Discharge Planner  [phone]

## 2024-10-08 NOTE — H&P
[facility]     Juliano Gar Patient Status:  Observation    3/20/1955 MRN [id number]   Location [facility] 3SW-A Attending Thea Michaels MD   Hosp Day # 0 PCP Martín Perez DO     Chief Complaint: BAck and leg pain     Subjective:    History of Present Illness:     Juliano Gar is a 69 year old male with ho gout, HTN. The patient sustained  fall last week, Xray without acute findings and he was DC home from ER. Pt comes back today with back pain, LLE pain- numbness. He is unable to ambulate as a result. No repeat falls. No numbness to buttock, no stool/urine incontinence.     History/Other:    Past Medical History:  Past Medical History:    Essential hypertension    GOUT    HYPERTENSION     Past Surgical History:   Past Surgical History:   Procedure Laterality Date    Colonoscopy  2016    two 3 mm rectosigmoid polyps     Colonoscopy,diagnostic  08    normal    Other surgical history  2011    Prostate Biopsy - Dr. SANTO    Other surgical history  2020    cystoscopy/TRUS Dr. Echeverria      Family History:   Family History   Problem Relation Age of Onset    Hypertension Father     Hypertension Mother     Heart Disorder Mother      Social History:    reports that he has never smoked. He has never used smokeless tobacco. He reports that he does not drink alcohol and does not use drugs.     Allergies: No Known Allergies    Medications:    No current facility-administered medications on file prior to encounter.     Current Outpatient Medications on File Prior to Encounter   Medication Sig Dispense Refill    tamsulosin 0.4 MG Oral Cap Take 1 capsule (0.4 mg total) by mouth After dinner.      tadalafil 5 MG Oral Tab Take 1 tablet (5 mg total) by mouth After dinner.      allopurinol 100 MG Oral Tab Take 1 tablet (100 mg total) by mouth Noon.      gabapentin 300 MG Oral Cap Take 1 capsule (300 mg total) by mouth in the morning and 1 capsule (300 mg total) before bedtime.  Do all this for 21 days. 42 capsule 0    rosuvastatin 10 MG Oral Tab Take 1 tablet (10 mg total) by mouth daily.      losartan 100 MG Oral Tab Take 1 tablet (100 mg total) by mouth daily.      CALCIUM-MAGNESIUM-VITAMIN D OR Take 1 tablet by mouth daily. 1000mg/500mg/10mcg      finasteride 5 MG Oral Tab Take 1 tablet (5 mg total) by mouth daily.      Omega-3 Fatty Acids (FISH OIL OR) Take 1 capsule by mouth daily.         Review of Systems:   A comprehensive review of systems was completed.    Pertinent positives and negatives noted in the HPI.    Objective:   Physical Exam:    /80 (BP Location: Right arm)   Pulse 75   Temp 98.6 °F (37 °C) (Oral)   Resp 20   Ht 5' 6\" (1.676 m)   Wt 145 lb (65.8 kg)   SpO2 94%   BMI 23.40 kg/m²   General: No acute distress, Alert  Respiratory: No rhonchi, no wheezes  Cardiovascular: S1, S2. Regular rate and rhythm  Abdomen: Soft, Non-tender, non-distended, positive bowel sounds  Neuro: Mp 4/5 in LLE, 5/5 in all ext   Extremities: No edema      Results:    Labs:      Labs Last 24 Hours:    No results for input(s): \"RBC\", \"HGB\", \"HCT\", \"MCV\", \"MCH\", \"MCHC\", \"RDW\", \"NEPRELIM\", \"WBC\", \"PLT\" in the last 168 hours.    No results for input(s): \"GLU\", \"BUN\", \"CREATSERUM\", \"GFRAA\", \"GFRNAA\", \"EGFRCR\", \"CA\", \"ALB\", \"NA\", \"K\", \"CL\", \"CO2\", \"ALKPHO\", \"AST\", \"ALT\", \"BILT\", \"TP\" in the last 168 hours.    Lab Results   Component Value Date    INR 0.96 03/09/2024       No results for input(s): \"TROP\", \"TROPHS\", \"CK\" in the last 168 hours.    No results for input(s): \"TROP\", \"PBNP\" in the last 168 hours.    No results for input(s): \"PCT\" in the last 168 hours.    Imaging: Imaging data reviewed in Epic.    Assessment & Plan:      #Back pain s/p fall with LLE weakness  MRI with disc bulge and extruded disc material  Medrol dose pack  Muscle relaxant  NeuroSx cs  PT/OT  #h/o UGIB 5/2023  No NSAIDS  PPI BID while on medrol   #CKD III  #Hypertension  #Gout         Plan of care discussed with  pt    Thea Michaels MD    Supplementary Documentation:     The 21st Century Cures Act makes medical notes like these available to patients in the interest of transparency. Please be advised this is a medical document. Medical documents are intended to carry relevant information, facts as evident, and the clinical opinion of the practitioner. The medical note is intended as peer to peer communication and may appear blunt or direct. It is written in medical language and may contain abbreviations or verbiage that are unfamiliar.               **Certification      PHYSICIAN Certification of Need for Inpatient Hospitalization - Initial Certification    Patient will require inpatient services that will reasonably be expected to span two midnight's based on the clinical documentation in H+P.   Based on patients current state of illness, I anticipate that, after discharge, patient will require TBD.

## 2024-10-08 NOTE — PLAN OF CARE
Alert and oriented x 4. VSS; on room air. Pain controlled with PRN medication. Voiding via urinal. Tolerating regular diet. Patient bedrest. Plan for PT/OT. Spine to see. POC discussed with patient. Safety precautions in place. Call light within reach.

## 2024-10-08 NOTE — DISCHARGE INSTRUCTIONS
Taper steroid dose instructed on medication package  If needed, take Flexeril and Norco at least an hour apart from each other. No driving while taking either of these medications.    Sometimes managing your health at home requires assistance.  The [facility] team has recognized your preference to use [facility].  They can be reached by phone at [phone].  The fax number for your reference is [phone].  A representative from the home health agency will contact you or your family to schedule your first visit.          Understanding Lumbar Radiculopathy (Sciatica)   Lumbar radiculopathy is irritation or inflammation of a nerve root in the low back. It's also called sciatica. It causes symptoms that spread out from the back down 1 or both legs. To understand this condition, it helps to understand the parts of the spine:   Vertebrae. These are bones that stack to form the spine. The lumbar spine contains 5 vertebrae near the bottom of your spine.  Disks. These are soft pads of tissue between the vertebrae. They act as shock absorbers for the spine.  Spinal canal.  This is a tunnel formed within the stacked vertebrae. In the lumbar spine, nerves run through this canal.  Nerves. These branch off and leave the spinal canal, traveling out to parts of the body. As they leave the spinal canal, nerves pass through openings between the vertebrae. The nerve root is the part of the nerve that is closest to the spinal canal.  Sciatic nerve. This is a large nerve formed from several nerve roots in the low back. This nerve extends down the back of the leg to the foot.  With lumbar radiculopathy, nerve roots in the low back become irritated. This leads to pain and symptoms. The sciatic nerve is commonly affected, so the condition is often called sciatica.     What causes lumbar radiculopathy?  Aging, injury, poor posture, extra body weight, and other issues can lead to  problems in the low back. These problems may then irritate nerve roots. They include:   Damage to a disk in the lumbar spine.  The damaged disk may then press on nearby nerve roots.  Degeneration from wear and tear, and aging. This can lead to narrowing (stenosis) of the openings between the vertebrae. The narrowed openings press on nerve roots as they leave the spinal canal.  Unstable spine. This is when a vertebra slips forward. It can then press on a nerve root.  Other, less common things can put pressure on nerves in the low back. These include diabetes, infection, or a tumor.   Symptoms of lumbar radiculopathy  These include:  Pain in the low back  Pain, numbness, tingling, or muscle weakness that travels into the buttocks, hip, groin, or leg  Muscle spasms in the low back, or leg  Treatment for lumbar radiculopathy  In most cases, your healthcare provider will first try treatments that help ease symptoms. These may include:   Prescription and over-the-counter pain medicines. These help ease pain, swelling, and irritation.  Limits on positions and activities that increase pain. But lying in bed or avoiding all movement is only advised for a short time.  Physical therapy, including exercises and stretches. This helps decrease pain and increase movement and function.  Steroid shots into the low back. This may help ease symptoms for a time.  Weight-loss program. If you're overweight, losing extra pounds may help ease symptoms.  In some cases, you may need surgery to fix the underlying problem. This depends on the cause, the symptoms, and how long the pain has lasted.   Possible complications  Over time, an irritated and inflamed nerve may become damaged. This may lead to long-lasting (permanent) numbness or weakness in your legs and feet. If symptoms change suddenly or get worse, tell your healthcare provider.   When to call your healthcare provider  Call your healthcare provider right away if you have any of  these:  New pain or pain that gets worse  New or increasing weakness, tingling, or numbness in your leg or foot  Problems controlling your bladder or bowel  Linda last reviewed this educational content on 12/1/2021 © 2000-2023 [employer]. All rights reserved. This information is not intended as a substitute for professional medical care. Always follow your healthcare professional's instructions.

## 2024-10-08 NOTE — PHYSICAL THERAPY NOTE
PHYSICAL THERAPY EVALUATION - INPATIENT     Room Number: 351/351-A  Evaluation Date: 10/8/2024  Type of Evaluation: Initial  Physician Order: PT Eval and Treat    Presenting Problem: L4-5 disc extrusion  Co-Morbidities : gout, HTN, lumbar radiculopathy  Reason for Therapy: Mobility Dysfunction and Discharge Planning    PHYSICAL THERAPY ASSESSMENT   Patient is a 69 year old male admitted 10/7/2024 for back and L LE pain. Imaging revealing for L4-5 disc extrusion.  Prior to admission, patient's baseline is independent.  Patient is currently functioning near baseline with bed mobility, transfers, and gait.  Patient is requiring contact guard assist as a result of the following impairments: pain and decreased muscular endurance.  Physical Therapy will continue to follow for duration of hospitalization.    Patient will benefit from continued skilled PT Services at discharge to promote prior level of function.  Anticipate patient will return home with home health PT.    PLAN DURING HOSPITALIZATION  Nursing Mobility Recommendation : 1 Assist     PT Treatment Plan: Bed mobility;Endurance;Energy conservation;Patient education;Family education;Neuromuscular re-educate;Balance training;Transfer training;Strengthening  Rehab Potential : Good  Frequency (Obs): 5x/week     CURRENT GOALS    Goal #1 Patient is able to demonstrate supine - sit EOB @ level: supervision     Goal #2 Patient is able to demonstrate transfers EOB to/from BSC at assistance level: supervision     Goal #3 Patient is able to ambulate 150 feet with assist device: LRAD at assistance level: supervision     Goal #4 Pt will ascend/descend 4 stairs safely with supervision   Goal #5    Goal #6    Goal Comments: Goals established on 10/8/2024      HOME SITUATION  Type of Home: House  Home Layout: Two level                     Lives With: Alone    Drives: Yes          Prior Level of [place]: Pt lives alone in 2 story home. Pt independent with ADL and mobility. Pt  does not use RW or cane at baseline. Pt has been staying with his brother in ranch style home.     SUBJECTIVE  \"I'm really surprised I'm able to walk.\"    OBJECTIVE     Fall Risk: High fall risk    WEIGHT BEARING RESTRICTION     PAIN ASSESSMENT  Ratin  Location: back  Management Techniques: Activity promotion;Repositioning    COGNITION  Overall Cognitive Status:  WFL - within functional limits    RANGE OF MOTION AND STRENGTH ASSESSMENT  Upper extremity ROM and strength are within functional limits     Lower extremity ROM is within functional limits     Lower extremity strength is within functional limits     BALANCE  Static Sitting: Good  Dynamic Sitting: Good  Static Standing: Fair -  Dynamic Standing: Fair -    ADDITIONAL TESTS                                    ACTIVITY TOLERANCE                         O2 WALK       NEUROLOGICAL FINDINGS                        AM-PAC '6-Clicks' INPATIENT SHORT FORM - BASIC MOBILITY  How much difficulty does the patient currently have...  Patient Difficulty: Turning over in bed (including adjusting bedclothes, sheets and blankets)?: A Little   Patient Difficulty: Sitting down on and standing up from a chair with arms (e.g., wheelchair, bedside commode, etc.): A Little   Patient Difficulty: Moving from lying on back to sitting on the side of the bed?: A Little   How much help from another person does the patient currently need...   Help from Another: Moving to and from a bed to a chair (including a wheelchair)?: A Little   Help from Another: Need to walk in hospital room?: A Little   Help from Another: Climbing 3-5 steps with a railing?: A Little     AM-PAC Score:  Raw Score: 18   Approx Degree of Impairment: 46.58%   Standardized Score (AM-PAC Scale): 43.63   CMS Modifier (G-Code): CK    FUNCTIONAL ABILITY STATUS  Gait Assessment   Functional Mobility/Gait Assessment  Gait Assistance: Contact guard assist  Distance (ft): 200  Assistive Device: Rolling walker    Skilled  Therapy Provided   VC for transfer set up with RW.   Sit-stand with RW and supervision.   Ambulatory with step to gait pattern and decreased stance time on L LE.   VC for sequencing progressing to step through gait pattern and reduced UE support of RW.   Demos good safety and pacing.   Returned to room up in bedside chair.     Bed Mobility:  Rolling: NT  Supine to sit: NT   Sit to supine: NT     Transfer Mobility:  Sit to stand: supervision   Stand to sit: supervision  Gait = CGA    Therapist's Comments: Reviewed spine precautions, RW use, and activity recommendations.     Exercise/Education Provided:  Bed mobility  Energy conservation  Functional activity tolerated  Gait training  Posture  Strengthening  Transfer training    Patient End of Session: Up in chair;Needs met;Call light within reach;RN aware of session/findings;All patient questions and concerns addressed;Hospital anti-slip socks      Patient Evaluation Complexity Level:  History Moderate - 1 or 2 personal factors and/or co-morbidities   Examination of body systems Low -  addressing 1-2 elements   Clinical Presentation Low- Stable   Clinical Decision Making Low Complexity       PT Session Time:  25 minutes  Gait Training: 10 minutes  Therapeutic Activity:  minutes  Neuromuscular Re-education:  minutes  Therapeutic Exercise:  minutes

## 2024-10-08 NOTE — PROGRESS NOTES
AVS reviewed, IV dc'd, will dc home w/ [facility], meds e-scribed to [facility] in [place] on [address], verbalized understanding of follow-up appts.

## 2024-10-09 ENCOUNTER — TELEPHONE (OUTPATIENT)
Dept: SURGERY | Facility: CLINIC | Age: 69
End: 2024-10-09

## 2024-10-09 NOTE — TELEPHONE ENCOUNTER
Patient has questions stating that it is very painful to lay, stand and walk. Unsure what to do since he's taking pain medication. Please contact and advise.

## 2024-10-09 NOTE — TELEPHONE ENCOUNTER
Called to patient. Brother answered phone. Patient is currently in therapy, I asked that he call back when done. Brother will have patient call.

## (undated) DEVICE — 3M™ RED DOT™ MONITORING ELECTRODE WITH FOAM TAPE AND STICKY GEL, 50/BAG, 20/CASE, 72/PLT 2570: Brand: RED DOT™

## (undated) DEVICE — ENDOSCOPY PACK - LOWER: Brand: MEDLINE INDUSTRIES, INC.

## (undated) DEVICE — FORCEP BIOPSY RJ4 LG CAP W/ND

## (undated) DEVICE — BLOCK BITE MAXI 60FR

## (undated) DEVICE — KIT ENDO ORCAPOD 160/180/190

## (undated) DEVICE — BIOGUARD CLEANING ADAPTER

## (undated) DEVICE — 1200CC GUARDIAN II: Brand: GUARDIAN

## (undated) DEVICE — 10FT COMBINED O2 DELIVERY/CO2 MONITORING. FILTER WITH MICROSTREAM TYPE LUER: Brand: DUAL ADULT NASAL CANNULA

## (undated) NOTE — LETTER
[facility] - [place] FOR BLOOD OR BLOOD COMPONENTS      In the course of your treatment, it may become necessary to administer a transfusion of blood or blood components. This form provides basic information concerning this procedure and, if signed by you, authorizes its performance by qualified medical personnel. DESCRIPTION OF PROCEDURE:  Blood is introduced into one of your veins, commonly in the arm, using a sterilized disposable needle. The amount of blood transfused, and whether the transfusion will be of blood or blood components is a judgment the physician will make based on your particular needs. RISKS:  The transfusion is a common procedure of low risk. MINOR AND TEMPORARY REACTIONS ARE NOT UNCOMMON, including a slight bruise, swelling or local reaction in the area where the needle pierces your skin, or a non-serious reaction to the transfused material itself, including headache, fever or a mild skin reaction, such as rash. Serious reactions are possible, though very unlikely and include severe allergic reaction (shock)  and destruction (hemolysis) of transfused blood cells. Infectious diseases which are known to be transmitted by blood transfusion include CERTAIN TYPES OF VIRAL HEPATITIS, a viral infection of the liver, HUMAN IMMUNODEFICIENCY VIRUS (HIV-1,2) infection, a viral infection known to cause ACQUIRED IMMUNODEFICIENCY SYNDROME (AIDS) AS WELL AS CERTAIN OTHER BACTERIAL, VIRAL AND PARASITIC DISEASES. While a minimal risk of acquiring an infectious disease from transfused blood exists, in accordance with Federal and State law all due care has been taken in donor selection and testing to avoid transmission of disease. ALTERNATIVES:  If loss of blood poses serious threats in the course of your treatment, THERE IS NO EFFECTIVE ALTERNATIVE TO BLOOD TRANSFUSION.  However, if you have any further questions on this matter, your physician will fully explain the alternatives to you if it has not already been done. I,Juliano Gar, have read/had read to me the above. I understand the matters bearing on the decision whether or not to authorize a transfusion of blood or blood components. I have no questions which have not been answered to my full satisfaction.  I hereby consent to such transfusion as  my physician may deem necessary or advisable in the course of my treatment.        _______________   __________________________________________________  Date     Signature of Patient, Parent or Legal Guardian      ([employer])      __________________________________________  Witness to Signature (title or relationship to patient)    Patient Name: Stef Palmer     : 3/20/1955                 Printed: May 6, 2023     Medical Record #: [id number]                    Page 1 of 1